# Patient Record
Sex: FEMALE | Race: WHITE | NOT HISPANIC OR LATINO | Employment: OTHER | URBAN - NONMETROPOLITAN AREA
[De-identification: names, ages, dates, MRNs, and addresses within clinical notes are randomized per-mention and may not be internally consistent; named-entity substitution may affect disease eponyms.]

---

## 2023-04-25 DIAGNOSIS — E78.2 MIXED HYPERLIPIDEMIA: ICD-10-CM

## 2023-04-25 DIAGNOSIS — I10 PRIMARY HYPERTENSION: Primary | ICD-10-CM

## 2023-04-26 PROBLEM — E55.9 VITAMIN D DEFICIENCY: Status: ACTIVE | Noted: 2023-04-26

## 2023-04-26 PROBLEM — M17.12 PRIMARY OSTEOARTHRITIS OF LEFT KNEE: Status: ACTIVE | Noted: 2023-04-26

## 2023-04-26 PROBLEM — D12.6 TUBULOVILLOUS ADENOMA OF COLON: Status: ACTIVE | Noted: 2023-04-26

## 2023-04-26 PROBLEM — E87.6 HYPOKALEMIA: Status: ACTIVE | Noted: 2023-04-26

## 2023-04-26 PROBLEM — M85.80 OSTEOPENIA: Status: RESOLVED | Noted: 2023-04-26 | Resolved: 2023-04-26

## 2023-04-26 PROBLEM — M81.8 AGE-RELATED OSTEOPOROSIS WITHOUT FRACTURE: Status: ACTIVE | Noted: 2023-04-26

## 2023-04-26 PROBLEM — E78.1 HYPERTRIGLYCERIDEMIA: Status: RESOLVED | Noted: 2023-04-26 | Resolved: 2023-04-26

## 2023-04-26 PROBLEM — E21.0 HYPERPARATHYROIDISM, PRIMARY (MULTI): Status: ACTIVE | Noted: 2023-04-26

## 2023-04-26 PROBLEM — R73.03 PREDIABETES: Status: ACTIVE | Noted: 2023-04-26

## 2023-04-26 PROBLEM — E83.52 HYPERCALCEMIA: Status: ACTIVE | Noted: 2023-04-26

## 2023-04-26 PROBLEM — E78.5 HYPERLIPIDEMIA: Status: ACTIVE | Noted: 2023-04-26

## 2023-04-26 PROBLEM — I10 HYPERTENSION: Status: ACTIVE | Noted: 2023-04-26

## 2023-04-26 RX ORDER — ATENOLOL AND CHLORTHALIDONE TABLET 100; 25 MG/1; MG/1
TABLET ORAL
Qty: 90 TABLET | Refills: 0 | Status: SHIPPED | OUTPATIENT
Start: 2023-04-26 | End: 2023-06-15 | Stop reason: SDUPTHER

## 2023-04-26 RX ORDER — LOVASTATIN 40 MG/1
TABLET ORAL
Qty: 90 TABLET | Refills: 0 | Status: SHIPPED | OUTPATIENT
Start: 2023-04-26 | End: 2023-06-15 | Stop reason: SDUPTHER

## 2023-06-15 ENCOUNTER — OFFICE VISIT (OUTPATIENT)
Dept: PRIMARY CARE | Facility: CLINIC | Age: 68
End: 2023-06-15
Payer: MEDICARE

## 2023-06-15 VITALS
WEIGHT: 144 LBS | SYSTOLIC BLOOD PRESSURE: 138 MMHG | HEART RATE: 80 BPM | HEIGHT: 60 IN | OXYGEN SATURATION: 97 % | DIASTOLIC BLOOD PRESSURE: 72 MMHG | BODY MASS INDEX: 28.27 KG/M2

## 2023-06-15 DIAGNOSIS — E21.0 HYPERPARATHYROIDISM, PRIMARY (MULTI): ICD-10-CM

## 2023-06-15 DIAGNOSIS — Z12.11 ENCOUNTER FOR SCREENING COLONOSCOPY: ICD-10-CM

## 2023-06-15 DIAGNOSIS — Z13.89 ENCOUNTER FOR SCREENING FOR OTHER DISORDER: ICD-10-CM

## 2023-06-15 DIAGNOSIS — I10 PRIMARY HYPERTENSION: ICD-10-CM

## 2023-06-15 DIAGNOSIS — E78.2 MIXED HYPERLIPIDEMIA: ICD-10-CM

## 2023-06-15 DIAGNOSIS — Z71.89 CARDIAC RISK COUNSELING: ICD-10-CM

## 2023-06-15 DIAGNOSIS — Z00.00 ROUTINE GENERAL MEDICAL EXAMINATION AT HEALTH CARE FACILITY: Primary | ICD-10-CM

## 2023-06-15 DIAGNOSIS — G62.9 NEUROPATHY: ICD-10-CM

## 2023-06-15 DIAGNOSIS — E83.52 HYPERCALCEMIA: ICD-10-CM

## 2023-06-15 DIAGNOSIS — E55.9 VITAMIN D DEFICIENCY: ICD-10-CM

## 2023-06-15 LAB
ALANINE AMINOTRANSFERASE (SGPT) (U/L) IN SER/PLAS: 21 U/L (ref 7–45)
ALBUMIN (G/DL) IN SER/PLAS: 4.4 G/DL (ref 3.4–5)
ALKALINE PHOSPHATASE (U/L) IN SER/PLAS: 76 U/L (ref 33–136)
ANION GAP IN SER/PLAS: 12 MMOL/L (ref 10–20)
ASPARTATE AMINOTRANSFERASE (SGOT) (U/L) IN SER/PLAS: 19 U/L (ref 9–39)
BILIRUBIN TOTAL (MG/DL) IN SER/PLAS: 0.9 MG/DL (ref 0–1.2)
CALCIUM (MG/DL) IN SER/PLAS: 10.7 MG/DL (ref 8.6–10.3)
CARBON DIOXIDE, TOTAL (MMOL/L) IN SER/PLAS: 32 MMOL/L (ref 21–32)
CHLORIDE (MMOL/L) IN SER/PLAS: 102 MMOL/L (ref 98–107)
CHOLESTEROL (MG/DL) IN SER/PLAS: 179 MG/DL (ref 0–199)
CHOLESTEROL IN HDL (MG/DL) IN SER/PLAS: 39.3 MG/DL
CHOLESTEROL/HDL RATIO: 4.6
CREATININE (MG/DL) IN SER/PLAS: 0.57 MG/DL (ref 0.5–1.05)
ERYTHROCYTE DISTRIBUTION WIDTH (RATIO) BY AUTOMATED COUNT: 13.5 % (ref 11.5–14.5)
ERYTHROCYTE MEAN CORPUSCULAR HEMOGLOBIN CONCENTRATION (G/DL) BY AUTOMATED: 33.2 G/DL (ref 32–36)
ERYTHROCYTE MEAN CORPUSCULAR VOLUME (FL) BY AUTOMATED COUNT: 92 FL (ref 80–100)
ERYTHROCYTES (10*6/UL) IN BLOOD BY AUTOMATED COUNT: 4.67 X10E12/L (ref 4–5.2)
GFR FEMALE: >90 ML/MIN/1.73M2
GLUCOSE (MG/DL) IN SER/PLAS: 120 MG/DL (ref 74–99)
HEMATOCRIT (%) IN BLOOD BY AUTOMATED COUNT: 43.1 % (ref 36–46)
HEMOGLOBIN (G/DL) IN BLOOD: 14.3 G/DL (ref 12–16)
LDL: 94 MG/DL (ref 0–99)
LEUKOCYTES (10*3/UL) IN BLOOD BY AUTOMATED COUNT: 8 X10E9/L (ref 4.4–11.3)
NON HDL CHOLESTEROL: 140 MG/DL
PARATHYRIN INTACT (PG/ML) IN SER/PLAS: 42.9 PG/ML (ref 18.5–88)
PLATELETS (10*3/UL) IN BLOOD AUTOMATED COUNT: 248 X10E9/L (ref 150–450)
POC APPEARANCE, URINE: CLEAR
POC BILIRUBIN, URINE: NEGATIVE
POC BLOOD, URINE: NEGATIVE
POC COLOR, URINE: YELLOW
POC GLUCOSE, URINE: NEGATIVE MG/DL
POC KETONES, URINE: NEGATIVE MG/DL
POC LEUKOCYTES, URINE: ABNORMAL
POC NITRITE,URINE: NEGATIVE
POC PH, URINE: 7 PH
POC PROTEIN, URINE: NEGATIVE MG/DL
POC SPECIFIC GRAVITY, URINE: 1.02
POC UROBILINOGEN, URINE: 0.2 EU/DL
POTASSIUM (MMOL/L) IN SER/PLAS: 3.8 MMOL/L (ref 3.5–5.3)
PROTEIN TOTAL: 6.4 G/DL (ref 6.4–8.2)
SODIUM (MMOL/L) IN SER/PLAS: 142 MMOL/L (ref 136–145)
TRIGLYCERIDE (MG/DL) IN SER/PLAS: 230 MG/DL (ref 0–149)
UREA NITROGEN (MG/DL) IN SER/PLAS: 19 MG/DL (ref 6–23)
VLDL: 46 MG/DL (ref 0–40)

## 2023-06-15 PROCEDURE — 82306 VITAMIN D 25 HYDROXY: CPT

## 2023-06-15 PROCEDURE — 99397 PER PM REEVAL EST PAT 65+ YR: CPT | Performed by: FAMILY MEDICINE

## 2023-06-15 PROCEDURE — 81003 URINALYSIS AUTO W/O SCOPE: CPT | Performed by: FAMILY MEDICINE

## 2023-06-15 PROCEDURE — G0446 INTENS BEHAVE THER CARDIO DX: HCPCS | Performed by: FAMILY MEDICINE

## 2023-06-15 PROCEDURE — 80053 COMPREHEN METABOLIC PANEL: CPT

## 2023-06-15 PROCEDURE — 99214 OFFICE O/P EST MOD 30 MIN: CPT | Performed by: FAMILY MEDICINE

## 2023-06-15 PROCEDURE — 1036F TOBACCO NON-USER: CPT | Performed by: FAMILY MEDICINE

## 2023-06-15 PROCEDURE — G0439 PPPS, SUBSEQ VISIT: HCPCS | Performed by: FAMILY MEDICINE

## 2023-06-15 PROCEDURE — 85027 COMPLETE CBC AUTOMATED: CPT

## 2023-06-15 PROCEDURE — 82607 VITAMIN B-12: CPT

## 2023-06-15 PROCEDURE — 3078F DIAST BP <80 MM HG: CPT | Performed by: FAMILY MEDICINE

## 2023-06-15 PROCEDURE — 1170F FXNL STATUS ASSESSED: CPT | Performed by: FAMILY MEDICINE

## 2023-06-15 PROCEDURE — 83970 ASSAY OF PARATHORMONE: CPT

## 2023-06-15 PROCEDURE — G0444 DEPRESSION SCREEN ANNUAL: HCPCS | Performed by: FAMILY MEDICINE

## 2023-06-15 PROCEDURE — 1160F RVW MEDS BY RX/DR IN RCRD: CPT | Performed by: FAMILY MEDICINE

## 2023-06-15 PROCEDURE — 1159F MED LIST DOCD IN RCRD: CPT | Performed by: FAMILY MEDICINE

## 2023-06-15 PROCEDURE — 3075F SYST BP GE 130 - 139MM HG: CPT | Performed by: FAMILY MEDICINE

## 2023-06-15 PROCEDURE — 80061 LIPID PANEL: CPT

## 2023-06-15 RX ORDER — MULTIVITAMIN
1 TABLET ORAL
COMMUNITY
Start: 2014-06-23

## 2023-06-15 RX ORDER — LOVASTATIN 40 MG/1
40 TABLET ORAL NIGHTLY
Qty: 90 TABLET | Refills: 1 | Status: SHIPPED | OUTPATIENT
Start: 2023-06-15 | End: 2023-10-18 | Stop reason: HOSPADM

## 2023-06-15 RX ORDER — CHOLECALCIFEROL (VITAMIN D3) 50 MCG
TABLET ORAL
COMMUNITY

## 2023-06-15 RX ORDER — POTASSIUM CHLORIDE 750 MG/1
10 TABLET, EXTENDED RELEASE ORAL DAILY
Qty: 90 TABLET | Refills: 1 | Status: SHIPPED | OUTPATIENT
Start: 2023-06-15 | End: 2023-08-21

## 2023-06-15 RX ORDER — OMEGA-3 FATTY ACIDS 1000 MG
1000 CAPSULE ORAL
COMMUNITY
Start: 2014-06-23

## 2023-06-15 RX ORDER — ASCORBIC ACID 500 MG
500 TABLET ORAL
COMMUNITY
Start: 2014-06-23

## 2023-06-15 RX ORDER — POTASSIUM CHLORIDE 750 MG/1
TABLET, EXTENDED RELEASE ORAL
COMMUNITY
Start: 2017-10-23 | End: 2023-06-15 | Stop reason: SDUPTHER

## 2023-06-15 RX ORDER — ATENOLOL AND CHLORTHALIDONE TABLET 100; 25 MG/1; MG/1
1 TABLET ORAL DAILY
Qty: 90 TABLET | Refills: 1 | Status: SHIPPED | OUTPATIENT
Start: 2023-06-15 | End: 2023-10-20

## 2023-06-15 ASSESSMENT — ACTIVITIES OF DAILY LIVING (ADL)
GROCERY_SHOPPING: INDEPENDENT
DRESSING: INDEPENDENT
BATHING: INDEPENDENT
DOING_HOUSEWORK: INDEPENDENT
TAKING_MEDICATION: INDEPENDENT
MANAGING_FINANCES: INDEPENDENT

## 2023-06-15 ASSESSMENT — ENCOUNTER SYMPTOMS
HEADACHES: 0
PALPITATIONS: 0
DYSURIA: 0
COLOR CHANGE: 0
COUGH: 0
DIARRHEA: 0
DIZZINESS: 0
EYE PAIN: 0
ABDOMINAL PAIN: 0
WEAKNESS: 0
NERVOUS/ANXIOUS: 0
EYE REDNESS: 0
FEVER: 0
TREMORS: 0
WHEEZING: 0
HEMATURIA: 0
BRUISES/BLEEDS EASILY: 0
BACK PAIN: 0
NUMBNESS: 0
POLYDIPSIA: 0
VOMITING: 0
ADENOPATHY: 0
FATIGUE: 0
BLOOD IN STOOL: 0
CHILLS: 0
NAUSEA: 0
POLYPHAGIA: 0
DYSPHORIC MOOD: 0
FREQUENCY: 0
ARTHRALGIAS: 1
SHORTNESS OF BREATH: 0
SORE THROAT: 0
TROUBLE SWALLOWING: 0
CONSTIPATION: 0
CHEST TIGHTNESS: 0

## 2023-06-15 ASSESSMENT — PATIENT HEALTH QUESTIONNAIRE - PHQ9
2. FEELING DOWN, DEPRESSED OR HOPELESS: NOT AT ALL
1. LITTLE INTEREST OR PLEASURE IN DOING THINGS: NOT AT ALL
SUM OF ALL RESPONSES TO PHQ9 QUESTIONS 1 AND 2: 0

## 2023-06-15 NOTE — PROGRESS NOTES
Subjective   Reason for Visit: Mari eDluca is an 67 y.o. female here for a Medicare Wellness visit.     Past Medical, Surgical, and Family History reviewed and updated in chart.    Reviewed all medications by prescribing practitioner or clinical pharmacist (such as prescriptions, OTCs, herbal therapies and supplements) and documented in the medical record.    HPI  No SE to meds   Denies fevers, chills, HA. CP, SOB, palpitations, weakness, dizziness, HA, vision changes   B12 helping with tingling in her hands and fingers at night     Appetite good  Sleeping is good   Energy level good   Balanced diet   Exercising less because of left knee pain     Ophtho- 11/22  Dentist-12/22  Colonoscopy- ordered   UA/Micro-  Mammo- 12/22  DEXA- 12/21  PAP- N/A  Lung CT- N/A  Coronary Calcium CT Score-  EKG-  Pneumovax- 12/19  Prevnar- 10/18  Flu-11/22  Shingrix-  Td-  Hep C-  Advance Directives- has them      Patient Care Team:  Anders Pappas DO as PCP - General (Family Medicine)  Anders Pappas DO as PCP - [employer] PCP  Franko Saravia DO as Consulting Physician (Orthopaedic Surgery)  Ashu Jason DPM as Surgeon (Podiatry)  Malcolm Simpson DO as Consulting Physician (Family Medicine)     Review of Systems   Constitutional:  Negative for chills, fatigue and fever.   HENT:  Negative for congestion, ear discharge, ear pain, hearing loss, nosebleeds, sore throat, tinnitus and trouble swallowing.    Eyes:  Negative for pain, redness and visual disturbance.   Respiratory:  Negative for cough, chest tightness, shortness of breath and wheezing.    Cardiovascular:  Negative for chest pain, palpitations and leg swelling.   Gastrointestinal:  Negative for abdominal pain, blood in stool, constipation, diarrhea, nausea and vomiting.   Endocrine: Negative for cold intolerance, heat intolerance, polydipsia, polyphagia and polyuria.   Genitourinary:  Negative for dysuria, frequency, hematuria and urgency.    Musculoskeletal:  Positive for arthralgias. Negative for back pain and gait problem.   Skin:  Negative for color change and rash.   Neurological:  Negative for dizziness, tremors, syncope, weakness, numbness and headaches.   Hematological:  Negative for adenopathy. Does not bruise/bleed easily.   Psychiatric/Behavioral:  Negative for dysphoric mood. The patient is not nervous/anxious.    All other systems reviewed and are negative.      Objective   Vitals:  /72   Pulse 80   Ht 1.524 m (5')   Wt 65.3 kg (144 lb)   SpO2 97%   BMI 28.12 kg/m²       Physical Exam  Constitutional:       Appearance: Normal appearance.   HENT:      Head: Normocephalic and atraumatic.      Right Ear: Tympanic membrane, ear canal and external ear normal.      Left Ear: Tympanic membrane, ear canal and external ear normal.      Nose: Nose normal.      Mouth/Throat:      Mouth: Mucous membranes are moist.      Pharynx: Oropharynx is clear.   Eyes:      Extraocular Movements: Extraocular movements intact.      Conjunctiva/sclera: Conjunctivae normal.      Pupils: Pupils are equal, round, and reactive to light.   Cardiovascular:      Rate and Rhythm: Normal rate and regular rhythm.      Pulses: Normal pulses.      Heart sounds: Normal heart sounds.   Pulmonary:      Effort: Pulmonary effort is normal.      Breath sounds: Normal breath sounds.   Abdominal:      General: Abdomen is flat. Bowel sounds are normal.      Palpations: Abdomen is soft.   Musculoskeletal:      Cervical back: Normal range of motion and neck supple.   Skin:     General: Skin is warm and dry.      Capillary Refill: Capillary refill takes less than 2 seconds.   Neurological:      General: No focal deficit present.      Mental Status: She is alert and oriented to person, place, and time.      Cranial Nerves: No cranial nerve deficit.      Sensory: No sensory deficit.      Motor: No weakness.      Deep Tendon Reflexes: Reflexes normal.   Psychiatric:         Mood  and Affect: Mood normal.         Behavior: Behavior normal.         Assessment/Plan   Problem List Items Addressed This Visit       Hypercalcemia    Relevant Orders    PTH, intact (Completed)    Hyperlipidemia    Relevant Medications    lovastatin (Mevacor) 40 mg tablet    Other Relevant Orders    Lipid Panel (Completed)    Comprehensive Metabolic Panel (Completed)    CBC (Completed)    Hyperparathyroidism, primary (CMS/HCC)    Relevant Orders    Comprehensive Metabolic Panel (Completed)    CBC (Completed)    Primary hypertension    Relevant Medications    atenoloL-chlorthalidone (Tenoretic) 100-25 mg tablet    Klor-Con M10 10 mEq ER tablet    Other Relevant Orders    POCT UA Automated manually resulted (Completed)    Vitamin D deficiency    Relevant Orders    Vitamin D, Total     Other Visit Diagnoses       Routine general medical examination at health care facility    -  Primary    Cardiac risk counseling        Encounter for screening for other disorder        Encounter for screening colonoscopy        Relevant Orders    Colonoscopy    Neuropathy        Relevant Orders    Vitamin B12          Renewed/continued rest of medications  Checked labs  Updated Health Maintenance in HPI section  HTN, controlled- continue meds, low sodium diet     Overweight- caloric restriction, increase CV exercise     Hyperlipidemia- continue meds, low fat/cholesterol diet     Hyperparathyroidism- follow PTH, vitamin D     Vitamin D- supplement, follow level    Neuropathy- check B12 level, continue supplement     F/U 6 months     Cardiovascular risk discussed and, if needed, lifestyle modifications recommended, including nutritional choices, exercise, and elimination of habits contributing to risk. We agreed on a plan to reduce the current cardiovascular risk. See the ASCVD Risk  Plus (13.6%) for data discussed regarding risk and risk reduction opportunities. Aspirin use/disuse was discussed after reviewing updated  guidelines

## 2023-06-15 NOTE — PATIENT INSTRUCTIONS
It is important to wear your sun block when you are going to spend more then a minute or two in the sun to reduce your risk of skin cancer    If you are a woman, then you should be doing a self breast exam once a month to feel for any lumps or bumps that do not resolve during the month. Call if you find this.    If you are a man, then you should be doing a self testicular exam once a month to feel for any lumps or bumps. Call if you find this.    You should get on average 150 minutes of cardiovascular exercise (such as brisk walking, running, biking, swimming, etc.) a week.  You should also limit food high in sodium, sugar and saturated/trans fats.  Eating lots of fruits and vegetables is good at helping lower cholesterol and blood pressure if prepared correctly    The age for colonoscopy is age 45-75 for average risk individuals    Prostate screen starts at age 50 and breast cancer screening is at age 40    Woman should get a pap smear between the ages of 21 and 65. The frequency depends on your age, the type of pap you had last and the result of the last pap.    Alcohol should be limited to 1 a day for women and 2 a day for men.    No amount of tobacco in any form is safe. It is recommended that no tobacco be used in any form.  Vapes are also not safe as there are multiple harmful chemicals in them and the heat can directly damage lung tissue.

## 2023-06-16 LAB
CALCIDIOL (25 OH VITAMIN D3) (NG/ML) IN SER/PLAS: 40 NG/ML
COBALAMIN (VITAMIN B12) (PG/ML) IN SER/PLAS: 754 PG/ML (ref 211–911)

## 2023-07-12 ENCOUNTER — OFFICE VISIT (OUTPATIENT)
Dept: PRIMARY CARE | Facility: CLINIC | Age: 68
End: 2023-07-12
Payer: MEDICARE

## 2023-07-12 VITALS
HEART RATE: 62 BPM | DIASTOLIC BLOOD PRESSURE: 68 MMHG | BODY MASS INDEX: 28.82 KG/M2 | OXYGEN SATURATION: 98 % | SYSTOLIC BLOOD PRESSURE: 112 MMHG | HEIGHT: 60 IN | WEIGHT: 146.8 LBS

## 2023-07-12 DIAGNOSIS — Z01.810 PREOP CARDIOVASCULAR EXAM: ICD-10-CM

## 2023-07-12 DIAGNOSIS — R73.03 PREDIABETES: ICD-10-CM

## 2023-07-12 DIAGNOSIS — E55.9 VITAMIN D DEFICIENCY: ICD-10-CM

## 2023-07-12 DIAGNOSIS — E21.0 HYPERPARATHYROIDISM, PRIMARY (MULTI): ICD-10-CM

## 2023-07-12 DIAGNOSIS — M17.12 PRIMARY OSTEOARTHRITIS OF LEFT KNEE: Primary | ICD-10-CM

## 2023-07-12 DIAGNOSIS — Z13.89 ENCOUNTER FOR SCREENING FOR OTHER DISORDER: ICD-10-CM

## 2023-07-12 DIAGNOSIS — I10 PRIMARY HYPERTENSION: ICD-10-CM

## 2023-07-12 DIAGNOSIS — E78.2 MIXED HYPERLIPIDEMIA: ICD-10-CM

## 2023-07-12 LAB
ALANINE AMINOTRANSFERASE (SGPT) (U/L) IN SER/PLAS: 21 U/L (ref 7–45)
ALBUMIN (G/DL) IN SER/PLAS: 4.2 G/DL (ref 3.4–5)
ALKALINE PHOSPHATASE (U/L) IN SER/PLAS: 72 U/L (ref 33–136)
ANION GAP IN SER/PLAS: 14 MMOL/L (ref 10–20)
ASPARTATE AMINOTRANSFERASE (SGOT) (U/L) IN SER/PLAS: 21 U/L (ref 9–39)
BASOPHILS (10*3/UL) IN BLOOD BY AUTOMATED COUNT: 0.03 X10E9/L (ref 0–0.1)
BASOPHILS/100 LEUKOCYTES IN BLOOD BY AUTOMATED COUNT: 0.5 % (ref 0–2)
BILIRUBIN TOTAL (MG/DL) IN SER/PLAS: 0.8 MG/DL (ref 0–1.2)
CALCIUM (MG/DL) IN SER/PLAS: 10.3 MG/DL (ref 8.6–10.3)
CARBON DIOXIDE, TOTAL (MMOL/L) IN SER/PLAS: 29 MMOL/L (ref 21–32)
CHLORIDE (MMOL/L) IN SER/PLAS: 102 MMOL/L (ref 98–107)
CREATININE (MG/DL) IN SER/PLAS: 0.59 MG/DL (ref 0.5–1.05)
EOSINOPHILS (10*3/UL) IN BLOOD BY AUTOMATED COUNT: 0.13 X10E9/L (ref 0–0.7)
EOSINOPHILS/100 LEUKOCYTES IN BLOOD BY AUTOMATED COUNT: 2 % (ref 0–6)
ERYTHROCYTE DISTRIBUTION WIDTH (RATIO) BY AUTOMATED COUNT: 13.2 % (ref 11.5–14.5)
ERYTHROCYTE MEAN CORPUSCULAR HEMOGLOBIN CONCENTRATION (G/DL) BY AUTOMATED: 32.5 G/DL (ref 32–36)
ERYTHROCYTE MEAN CORPUSCULAR VOLUME (FL) BY AUTOMATED COUNT: 93 FL (ref 80–100)
ERYTHROCYTES (10*6/UL) IN BLOOD BY AUTOMATED COUNT: 4.71 X10E12/L (ref 4–5.2)
GFR FEMALE: >90 ML/MIN/1.73M2
GLUCOSE (MG/DL) IN SER/PLAS: 111 MG/DL (ref 74–99)
HEMATOCRIT (%) IN BLOOD BY AUTOMATED COUNT: 43.7 % (ref 36–46)
HEMOGLOBIN (G/DL) IN BLOOD: 14.2 G/DL (ref 12–16)
IMMATURE GRANULOCYTES/100 LEUKOCYTES IN BLOOD BY AUTOMATED COUNT: 0.2 % (ref 0–0.9)
LEUKOCYTES (10*3/UL) IN BLOOD BY AUTOMATED COUNT: 6.5 X10E9/L (ref 4.4–11.3)
LYMPHOCYTES (10*3/UL) IN BLOOD BY AUTOMATED COUNT: 2.94 X10E9/L (ref 1.2–4.8)
LYMPHOCYTES/100 LEUKOCYTES IN BLOOD BY AUTOMATED COUNT: 45 % (ref 13–44)
MONOCYTES (10*3/UL) IN BLOOD BY AUTOMATED COUNT: 0.66 X10E9/L (ref 0.1–1)
MONOCYTES/100 LEUKOCYTES IN BLOOD BY AUTOMATED COUNT: 10.1 % (ref 2–10)
NEUTROPHILS (10*3/UL) IN BLOOD BY AUTOMATED COUNT: 2.76 X10E9/L (ref 1.2–7.7)
NEUTROPHILS/100 LEUKOCYTES IN BLOOD BY AUTOMATED COUNT: 42.2 % (ref 40–80)
PLATELETS (10*3/UL) IN BLOOD AUTOMATED COUNT: 252 X10E9/L (ref 150–450)
POC APPEARANCE, URINE: CLEAR
POC BILIRUBIN, URINE: NEGATIVE
POC BLOOD, URINE: NEGATIVE
POC COLOR, URINE: YELLOW
POC GLUCOSE, URINE: NEGATIVE MG/DL
POC KETONES, URINE: NEGATIVE MG/DL
POC LEUKOCYTES, URINE: ABNORMAL
POC NITRITE,URINE: NEGATIVE
POC PH, URINE: 8.5 PH
POC PROTEIN, URINE: NEGATIVE MG/DL
POC SPECIFIC GRAVITY, URINE: 1.02
POC UROBILINOGEN, URINE: 0.2 EU/DL
POTASSIUM (MMOL/L) IN SER/PLAS: 3.5 MMOL/L (ref 3.5–5.3)
PROTEIN TOTAL: 6.4 G/DL (ref 6.4–8.2)
SODIUM (MMOL/L) IN SER/PLAS: 141 MMOL/L (ref 136–145)
UREA NITROGEN (MG/DL) IN SER/PLAS: 18 MG/DL (ref 6–23)

## 2023-07-12 PROCEDURE — 93000 ELECTROCARDIOGRAM COMPLETE: CPT | Performed by: FAMILY MEDICINE

## 2023-07-12 PROCEDURE — 85025 COMPLETE CBC W/AUTO DIFF WBC: CPT

## 2023-07-12 PROCEDURE — G0442 ANNUAL ALCOHOL SCREEN 15 MIN: HCPCS | Performed by: FAMILY MEDICINE

## 2023-07-12 PROCEDURE — 1160F RVW MEDS BY RX/DR IN RCRD: CPT | Performed by: FAMILY MEDICINE

## 2023-07-12 PROCEDURE — 3078F DIAST BP <80 MM HG: CPT | Performed by: FAMILY MEDICINE

## 2023-07-12 PROCEDURE — 1159F MED LIST DOCD IN RCRD: CPT | Performed by: FAMILY MEDICINE

## 2023-07-12 PROCEDURE — 1036F TOBACCO NON-USER: CPT | Performed by: FAMILY MEDICINE

## 2023-07-12 PROCEDURE — 81003 URINALYSIS AUTO W/O SCOPE: CPT | Performed by: FAMILY MEDICINE

## 2023-07-12 PROCEDURE — 80053 COMPREHEN METABOLIC PANEL: CPT

## 2023-07-12 PROCEDURE — 3074F SYST BP LT 130 MM HG: CPT | Performed by: FAMILY MEDICINE

## 2023-07-12 PROCEDURE — 99214 OFFICE O/P EST MOD 30 MIN: CPT | Performed by: FAMILY MEDICINE

## 2023-07-12 ASSESSMENT — ENCOUNTER SYMPTOMS
NAUSEA: 0
FEVER: 0
CHILLS: 0
HEADACHES: 0
ADENOPATHY: 0
VOMITING: 0
PALPITATIONS: 0
TREMORS: 0
FREQUENCY: 0
DIARRHEA: 0
HEMATURIA: 0
WHEEZING: 0
ARTHRALGIAS: 1
SHORTNESS OF BREATH: 0
POLYDIPSIA: 0
EYE REDNESS: 0
WEAKNESS: 0
NERVOUS/ANXIOUS: 0
EYE PAIN: 0
COUGH: 0
DYSPHORIC MOOD: 0
COLOR CHANGE: 0
CONSTIPATION: 0
TROUBLE SWALLOWING: 0
BLOOD IN STOOL: 0
NUMBNESS: 0
CHEST TIGHTNESS: 0
DIZZINESS: 0
BRUISES/BLEEDS EASILY: 0
BACK PAIN: 0
POLYPHAGIA: 0
DYSURIA: 0
ABDOMINAL PAIN: 0
SORE THROAT: 0
FATIGUE: 0

## 2023-07-12 NOTE — PATIENT INSTRUCTIONS
Hold aspirin for 5-7 days prior to surgery and restart 2-3 days after or as recommended by surgeon. No ibuprofen, naproxen or other anti-inflammatory during that period as well. You may take acetaminophen (tylenol) instead.
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
## 2023-08-19 DIAGNOSIS — I10 PRIMARY HYPERTENSION: ICD-10-CM

## 2023-08-21 RX ORDER — POTASSIUM CHLORIDE 750 MG/1
10 TABLET, FILM COATED, EXTENDED RELEASE ORAL DAILY
Qty: 100 TABLET | Refills: 2 | Status: SHIPPED | OUTPATIENT
Start: 2023-08-21 | End: 2024-05-28

## 2023-09-30 PROBLEM — L81.4 OTHER MELANIN HYPERPIGMENTATION: Status: ACTIVE | Noted: 2022-06-08

## 2023-09-30 PROBLEM — D22.61 MELANOCYTIC NEVI OF RIGHT UPPER LIMB, INCLUDING SHOULDER: Status: ACTIVE | Noted: 2022-06-08

## 2023-09-30 PROBLEM — L90.5 SCAR CONDITION AND FIBROSIS OF SKIN: Status: ACTIVE | Noted: 2022-06-08

## 2023-09-30 PROBLEM — D18.01 HEMANGIOMA OF SKIN AND SUBCUTANEOUS TISSUE: Status: ACTIVE | Noted: 2022-06-08

## 2023-09-30 PROBLEM — D22.71 MELANOCYTIC NEVI OF RIGHT LOWER LIMB, INCLUDING HIP: Status: ACTIVE | Noted: 2022-06-08

## 2023-09-30 PROBLEM — D22.5 MELANOCYTIC NEVI OF TRUNK: Status: ACTIVE | Noted: 2022-06-08

## 2023-09-30 PROBLEM — D22.72 MELANOCYTIC NEVI OF LEFT LOWER LIMB, INCLUDING HIP: Status: ACTIVE | Noted: 2022-06-08

## 2023-09-30 PROBLEM — L82.1 OTHER SEBORRHEIC KERATOSIS: Status: ACTIVE | Noted: 2022-06-08

## 2023-09-30 PROBLEM — D22.62 MELANOCYTIC NEVI OF LEFT UPPER LIMB, INCLUDING SHOULDER: Status: ACTIVE | Noted: 2022-06-08

## 2023-09-30 PROBLEM — D48.5 NEOPLASM OF UNCERTAIN BEHAVIOR OF SKIN: Status: ACTIVE | Noted: 2022-06-08

## 2023-09-30 PROBLEM — L91.8 OTHER HYPERTROPHIC DISORDERS OF THE SKIN: Status: ACTIVE | Noted: 2022-06-08

## 2023-09-30 RX ORDER — HYDROCODONE BITARTRATE AND ACETAMINOPHEN 5; 325 MG/1; MG/1
2 TABLET ORAL EVERY 8 HOURS PRN
Status: ON HOLD | COMMUNITY
Start: 2023-08-07 | End: 2023-10-13

## 2023-09-30 RX ORDER — ASPIRIN 325 MG
325 TABLET ORAL DAILY
Status: ON HOLD | COMMUNITY
Start: 2023-07-27 | End: 2023-10-13 | Stop reason: WASHOUT

## 2023-09-30 RX ORDER — SOD SULF/POT CHLORIDE/MAG SULF 1.479 G
TABLET ORAL
COMMUNITY
Start: 2023-07-05 | End: 2023-10-18 | Stop reason: HOSPADM

## 2023-09-30 RX ORDER — IBUPROFEN 800 MG/1
800 TABLET ORAL
COMMUNITY
Start: 2023-07-27

## 2023-09-30 RX ORDER — HYDROMORPHONE HYDROCHLORIDE 2 MG/1
1 TABLET ORAL 2 TIMES DAILY PRN
COMMUNITY
Start: 2023-07-27 | End: 2023-11-01 | Stop reason: ALTCHOICE

## 2023-09-30 RX ORDER — LOVASTATIN 20 MG/1
1 TABLET ORAL
COMMUNITY
Start: 2014-06-23 | End: 2023-12-18 | Stop reason: SDUPTHER

## 2023-10-05 ENCOUNTER — OFFICE VISIT (OUTPATIENT)
Dept: DERMATOLOGY | Facility: CLINIC | Age: 68
End: 2023-10-05
Payer: MEDICARE

## 2023-10-05 DIAGNOSIS — L82.1 SEBORRHEIC KERATOSIS: Primary | ICD-10-CM

## 2023-10-05 DIAGNOSIS — D18.01 HEMANGIOMA OF SKIN: ICD-10-CM

## 2023-10-05 DIAGNOSIS — Z12.83 SCREENING EXAM FOR SKIN CANCER: ICD-10-CM

## 2023-10-05 DIAGNOSIS — L81.4 LENTIGO: ICD-10-CM

## 2023-10-05 PROBLEM — D22.71 MELANOCYTIC NEVI OF RIGHT LOWER LIMB, INCLUDING HIP: Status: RESOLVED | Noted: 2022-06-08 | Resolved: 2023-10-05

## 2023-10-05 PROBLEM — D22.62 MELANOCYTIC NEVI OF LEFT UPPER LIMB, INCLUDING SHOULDER: Status: RESOLVED | Noted: 2022-06-08 | Resolved: 2023-10-05

## 2023-10-05 PROBLEM — Z87.39 HISTORY OF NECROTIZING FASCIITIS: Status: ACTIVE | Noted: 2023-10-05

## 2023-10-05 PROBLEM — D22.61 MELANOCYTIC NEVI OF RIGHT UPPER LIMB, INCLUDING SHOULDER: Status: RESOLVED | Noted: 2022-06-08 | Resolved: 2023-10-05

## 2023-10-05 PROBLEM — L91.8 OTHER HYPERTROPHIC DISORDERS OF THE SKIN: Status: RESOLVED | Noted: 2022-06-08 | Resolved: 2023-10-05

## 2023-10-05 PROBLEM — D22.72 MELANOCYTIC NEVI OF LEFT LOWER LIMB, INCLUDING HIP: Status: RESOLVED | Noted: 2022-06-08 | Resolved: 2023-10-05

## 2023-10-05 PROBLEM — D22.5 MELANOCYTIC NEVI OF TRUNK: Status: RESOLVED | Noted: 2022-06-08 | Resolved: 2023-10-05

## 2023-10-05 PROCEDURE — 1160F RVW MEDS BY RX/DR IN RCRD: CPT | Performed by: DERMATOLOGY

## 2023-10-05 PROCEDURE — 1159F MED LIST DOCD IN RCRD: CPT | Performed by: DERMATOLOGY

## 2023-10-05 PROCEDURE — 1036F TOBACCO NON-USER: CPT | Performed by: DERMATOLOGY

## 2023-10-05 PROCEDURE — 99213 OFFICE O/P EST LOW 20 MIN: CPT | Performed by: DERMATOLOGY

## 2023-10-05 ASSESSMENT — DERMATOLOGY PATIENT ASSESSMENT
DO YOU USE A TANNING BED: NO
ARE YOU AN ORGAN TRANSPLANT RECIPIENT: NO
DO YOU USE SUNSCREEN: DAILY
ARE YOU TRYING TO GET PREGNANT: NO
ARE YOU ON BIRTH CONTROL: NO
DO YOU HAVE IRREGULAR MENSTRUAL CYCLES: NO
DO YOU HAVE ANY NEW OR CHANGING LESIONS: NO
HAVE YOU HAD OR DO YOU HAVE A STAPH INFECTION: NO
HAVE YOU HAD OR DO YOU HAVE VASCULAR DISEASE: NO

## 2023-10-05 ASSESSMENT — ITCH NUMERIC RATING SCALE: HOW SEVERE IS YOUR ITCHING?: 0

## 2023-10-05 ASSESSMENT — DERMATOLOGY QUALITY OF LIFE (QOL) ASSESSMENT
ARE THERE EXCLUSIONS OR EXCEPTIONS FOR THE QUALITY OF LIFE ASSESSMENT: NO
RATE HOW BOTHERED YOU ARE BY SYMPTOMS OF YOUR SKIN PROBLEM (EG, ITCHING, STINGING BURNING, HURTING OR SKIN IRRITATION): 0 - NEVER BOTHERED
RATE HOW BOTHERED YOU ARE BY EFFECTS OF YOUR SKIN PROBLEMS ON YOUR ACTIVITIES (EG, GOING OUT, ACCOMPLISHING WHAT YOU WANT, WORK ACTIVITIES OR YOUR RELATIONSHIPS WITH OTHERS): 0 - NEVER BOTHERED
DATE THE QUALITY-OF-LIFE ASSESSMENT WAS COMPLETED: 66752
RATE HOW EMOTIONALLY BOTHERED YOU ARE BY YOUR SKIN PROBLEM (FOR EXAMPLE, WORRY, EMBARRASSMENT, FRUSTRATION): 0 - NEVER BOTHERED

## 2023-10-05 ASSESSMENT — PATIENT GLOBAL ASSESSMENT (PGA): PATIENT GLOBAL ASSESSMENT: PATIENT GLOBAL ASSESSMENT:  4 - SEVERE

## 2023-10-05 NOTE — PROGRESS NOTES
Subjective     Mari Deluca is a 68 y.o. female who presents for the following: Skin Check.     Review of Systems:  No other skin or systemic complaints other than what is documented elsewhere in the note.    The following portions of the chart were reviewed this encounter and updated as appropriate:         Skin Cancer History  No skin cancer on file.    -- Biopsy 6/6/19 right medial cheek - cystic space in the dermis. Top of an epidermal inclusion cyst was favored, but could also could be consistent with differentiated SCC.      Specialty Problems          Dermatology Problems    Neoplasm of uncertain behavior of skin    Scar condition and fibrosis of skin     Biopsy 6/6/19 right medial cheek - cystic space in the dermis. Top of an epidermal inclusion cyst was favored, but could also could be consistent with differentiated SCC.    Hx of necrotizing fasciitis 2005. Residual scar of left vulva.              Objective   Well appearing patient in no apparent distress; mood and affect are within normal limits.    A full examination was performed including scalp, head, eyes, ears, nose, lips, neck, chest, axillae, abdomen, back, buttocks, bilateral upper extremities, bilateral lower extremities, hands, feet, fingers, toes, fingernails, and toenails. All findings within normal limits unless otherwise noted below.    Assessment/Plan   1. Seborrheic keratosis  Stuck on verrucous, tan-brown papules and plaques.      - Discussed benign nature and that no treatment is necessary unless it becomes painful or increases in size. Patient opts for clinical monitoring at this time.     2. Lentigo  Scattered tan macules in sun-exposed areas.    - Discussed benign nature and that no treatment is necessary unless it becomes painful or increases in size. Patient opts for clinical monitoring at this time.     3. Hemangioma of skin  Scattered cherry-red papule(s).    - Discussed benign nature and that no treatment is necessary unless  it becomes painful or increases in size. Patient opts for clinical monitoring at this time.     4. Screening exam for skin cancer    Full body skin exam  -No lesions concerning for malignancy on the remainder the skin exam today   -Scars from prior skin treatments were evaluated and no evidence of recurrence.  - The ugly duckling sign was discussed. Monitor for any skin lesions that are different in color, shape, or size than others on body  -Sun protection was discussed. Recommend SPF 30+, hats with brims, sun protective clothing, and avoiding sun exposure between 10 AM and 2 PM whenever possible  -Recommend regular skin exams or sooner if new or changing lesions

## 2023-10-13 ENCOUNTER — APPOINTMENT (OUTPATIENT)
Dept: RADIOLOGY | Facility: HOSPITAL | Age: 68
End: 2023-10-13
Payer: MEDICARE

## 2023-10-13 ENCOUNTER — HOSPITAL ENCOUNTER (OUTPATIENT)
Dept: CARDIOLOGY | Facility: HOSPITAL | Age: 68
Discharge: HOME | End: 2023-10-13
Payer: MEDICARE

## 2023-10-13 ENCOUNTER — HOSPITAL ENCOUNTER (INPATIENT)
Facility: HOSPITAL | Age: 68
LOS: 4 days | Discharge: HOME | DRG: 397 | End: 2023-10-18
Attending: FAMILY MEDICINE | Admitting: INTERNAL MEDICINE
Payer: MEDICARE

## 2023-10-13 ENCOUNTER — HOSPITAL ENCOUNTER (EMERGENCY)
Facility: HOSPITAL | Age: 68
Discharge: OTHER INSTITUTION | End: 2023-10-13
Attending: EMERGENCY MEDICINE
Payer: MEDICARE

## 2023-10-13 ENCOUNTER — ANESTHESIA EVENT (OUTPATIENT)
Dept: OPERATING ROOM | Facility: HOSPITAL | Age: 68
DRG: 397 | End: 2023-10-13
Payer: MEDICARE

## 2023-10-13 VITALS
OXYGEN SATURATION: 90 % | HEART RATE: 103 BPM | BODY MASS INDEX: 28.07 KG/M2 | DIASTOLIC BLOOD PRESSURE: 57 MMHG | HEIGHT: 60 IN | RESPIRATION RATE: 16 BRPM | TEMPERATURE: 99.7 F | WEIGHT: 143 LBS | SYSTOLIC BLOOD PRESSURE: 118 MMHG

## 2023-10-13 DIAGNOSIS — J96.01 ACUTE RESPIRATORY FAILURE WITH HYPOXIA (MULTI): ICD-10-CM

## 2023-10-13 DIAGNOSIS — K35.33 ACUTE APPENDICITIS WITH PERFORATION, LOCALIZED PERITONITIS, AND ABSCESS, WITHOUT GANGRENE: ICD-10-CM

## 2023-10-13 DIAGNOSIS — K35.33 ACUTE APPENDICITIS WITH PERFORATION, LOCALIZED PERITONITIS, AND ABSCESS, WITHOUT GANGRENE: Primary | ICD-10-CM

## 2023-10-13 DIAGNOSIS — K35.80 ACUTE APPENDICITIS: Primary | ICD-10-CM

## 2023-10-13 LAB
ALBUMIN SERPL BCP-MCNC: 4.6 G/DL (ref 3.4–5)
ALP SERPL-CCNC: 74 U/L (ref 33–136)
ALT SERPL W P-5'-P-CCNC: 26 U/L (ref 7–45)
ANION GAP SERPL CALC-SCNC: 17 MMOL/L (ref 10–20)
APPEARANCE UR: ABNORMAL
AST SERPL W P-5'-P-CCNC: 22 U/L (ref 9–39)
BASOPHILS # BLD AUTO: 0.02 X10*3/UL (ref 0–0.1)
BASOPHILS NFR BLD AUTO: 0.1 %
BILIRUB SERPL-MCNC: 1.5 MG/DL (ref 0–1.2)
BILIRUB UR STRIP.AUTO-MCNC: NEGATIVE MG/DL
BUN SERPL-MCNC: 15 MG/DL (ref 6–23)
CALCIUM SERPL-MCNC: 10.2 MG/DL (ref 8.6–10.3)
CHLORIDE SERPL-SCNC: 94 MMOL/L (ref 98–107)
CO2 SERPL-SCNC: 26 MMOL/L (ref 21–32)
COLOR UR: ABNORMAL
CREAT SERPL-MCNC: 0.7 MG/DL (ref 0.5–1.05)
EOSINOPHIL # BLD AUTO: 0 X10*3/UL (ref 0–0.7)
EOSINOPHIL NFR BLD AUTO: 0 %
ERYTHROCYTE [DISTWIDTH] IN BLOOD BY AUTOMATED COUNT: 13 % (ref 11.5–14.5)
GFR SERPL CREATININE-BSD FRML MDRD: >90 ML/MIN/1.73M*2
GLUCOSE SERPL-MCNC: 156 MG/DL (ref 74–99)
GLUCOSE UR STRIP.AUTO-MCNC: NEGATIVE MG/DL
HCT VFR BLD AUTO: 43.5 % (ref 36–46)
HGB BLD-MCNC: 14.9 G/DL (ref 12–16)
HOLD SPECIMEN: NORMAL
IMM GRANULOCYTES # BLD AUTO: 0.07 X10*3/UL (ref 0–0.7)
IMM GRANULOCYTES NFR BLD AUTO: 0.5 % (ref 0–0.9)
KETONES UR STRIP.AUTO-MCNC: ABNORMAL MG/DL
LACTATE SERPL-SCNC: 3.5 MMOL/L (ref 0.4–2)
LEUKOCYTE ESTERASE UR QL STRIP.AUTO: ABNORMAL
LIPASE SERPL-CCNC: 16 U/L (ref 9–82)
LYMPHOCYTES # BLD AUTO: 1.46 X10*3/UL (ref 1.2–4.8)
LYMPHOCYTES NFR BLD AUTO: 9.9 %
MAGNESIUM SERPL-MCNC: 1.53 MG/DL (ref 1.6–2.4)
MCH RBC QN AUTO: 30.1 PG (ref 26–34)
MCHC RBC AUTO-ENTMCNC: 34.3 G/DL (ref 32–36)
MCV RBC AUTO: 88 FL (ref 80–100)
MONOCYTES # BLD AUTO: 1.03 X10*3/UL (ref 0.1–1)
MONOCYTES NFR BLD AUTO: 7 %
MUCOUS THREADS #/AREA URNS AUTO: NORMAL /LPF
NEUTROPHILS # BLD AUTO: 12.16 X10*3/UL (ref 1.2–7.7)
NEUTROPHILS NFR BLD AUTO: 82.5 %
NITRITE UR QL STRIP.AUTO: NEGATIVE
NRBC BLD-RTO: 0 /100 WBCS (ref 0–0)
PH UR STRIP.AUTO: 5 [PH]
PLATELET # BLD AUTO: 271 X10*3/UL (ref 150–450)
PMV BLD AUTO: 8.6 FL (ref 7.5–11.5)
POTASSIUM SERPL-SCNC: 3 MMOL/L (ref 3.5–5.3)
PROT SERPL-MCNC: 7.2 G/DL (ref 6.4–8.2)
PROT UR STRIP.AUTO-MCNC: ABNORMAL MG/DL
RBC # BLD AUTO: 4.95 X10*6/UL (ref 4–5.2)
RBC # UR STRIP.AUTO: ABNORMAL /UL
RBC #/AREA URNS AUTO: NORMAL /HPF
SARS-COV-2 RNA RESP QL NAA+PROBE: NOT DETECTED
SODIUM SERPL-SCNC: 134 MMOL/L (ref 136–145)
SP GR UR STRIP.AUTO: 1.02
SQUAMOUS #/AREA URNS AUTO: NORMAL /HPF
TRANS CELLS #/AREA UR COMP ASSIST: NORMAL /HPF
UROBILINOGEN UR STRIP.AUTO-MCNC: <2 MG/DL
WBC # BLD AUTO: 14.7 X10*3/UL (ref 4.4–11.3)
WBC #/AREA URNS AUTO: NORMAL /HPF

## 2023-10-13 PROCEDURE — 2500000004 HC RX 250 GENERAL PHARMACY W/ HCPCS (ALT 636 FOR OP/ED): Performed by: EMERGENCY MEDICINE

## 2023-10-13 PROCEDURE — 81003 URINALYSIS AUTO W/O SCOPE: CPT | Performed by: EMERGENCY MEDICINE

## 2023-10-13 PROCEDURE — 84075 ASSAY ALKALINE PHOSPHATASE: CPT | Performed by: EMERGENCY MEDICINE

## 2023-10-13 PROCEDURE — 85610 PROTHROMBIN TIME: CPT | Performed by: NURSE PRACTITIONER

## 2023-10-13 PROCEDURE — 2500000004 HC RX 250 GENERAL PHARMACY W/ HCPCS (ALT 636 FOR OP/ED): Performed by: NURSE PRACTITIONER

## 2023-10-13 PROCEDURE — 87086 URINE CULTURE/COLONY COUNT: CPT | Mod: CMCLAB,GENLAB | Performed by: EMERGENCY MEDICINE

## 2023-10-13 PROCEDURE — 93010 ELECTROCARDIOGRAM REPORT: CPT | Performed by: INTERNAL MEDICINE

## 2023-10-13 PROCEDURE — 99285 EMERGENCY DEPT VISIT HI MDM: CPT | Mod: 25

## 2023-10-13 PROCEDURE — 87081 CULTURE SCREEN ONLY: CPT | Mod: CMCLAB,GEALAB | Performed by: NURSE PRACTITIONER

## 2023-10-13 PROCEDURE — 87075 CULTR BACTERIA EXCEPT BLOOD: CPT | Mod: CMCLAB,GENLAB | Performed by: EMERGENCY MEDICINE

## 2023-10-13 PROCEDURE — 83605 ASSAY OF LACTIC ACID: CPT | Performed by: EMERGENCY MEDICINE

## 2023-10-13 PROCEDURE — 96375 TX/PRO/DX INJ NEW DRUG ADDON: CPT

## 2023-10-13 PROCEDURE — 83605 ASSAY OF LACTIC ACID: CPT | Performed by: NURSE PRACTITIONER

## 2023-10-13 PROCEDURE — 2550000001 HC RX 255 CONTRASTS: Performed by: EMERGENCY MEDICINE

## 2023-10-13 PROCEDURE — 96361 HYDRATE IV INFUSION ADD-ON: CPT

## 2023-10-13 PROCEDURE — 87635 SARS-COV-2 COVID-19 AMP PRB: CPT | Performed by: EMERGENCY MEDICINE

## 2023-10-13 PROCEDURE — 93005 ELECTROCARDIOGRAM TRACING: CPT

## 2023-10-13 PROCEDURE — 87040 BLOOD CULTURE FOR BACTERIA: CPT | Mod: CMCLAB,GENLAB | Performed by: EMERGENCY MEDICINE

## 2023-10-13 PROCEDURE — 36415 COLL VENOUS BLD VENIPUNCTURE: CPT | Performed by: EMERGENCY MEDICINE

## 2023-10-13 PROCEDURE — 99223 1ST HOSP IP/OBS HIGH 75: CPT | Performed by: NURSE PRACTITIONER

## 2023-10-13 PROCEDURE — 2500000001 HC RX 250 WO HCPCS SELF ADMINISTERED DRUGS (ALT 637 FOR MEDICARE OP): Performed by: NURSE PRACTITIONER

## 2023-10-13 PROCEDURE — 74177 CT ABD & PELVIS W/CONTRAST: CPT | Mod: MG

## 2023-10-13 PROCEDURE — G0378 HOSPITAL OBSERVATION PER HR: HCPCS

## 2023-10-13 PROCEDURE — G0379 DIRECT REFER HOSPITAL OBSERV: HCPCS

## 2023-10-13 PROCEDURE — 96365 THER/PROPH/DIAG IV INF INIT: CPT

## 2023-10-13 PROCEDURE — 85025 COMPLETE CBC W/AUTO DIFF WBC: CPT | Performed by: EMERGENCY MEDICINE

## 2023-10-13 PROCEDURE — 74177 CT ABD & PELVIS W/CONTRAST: CPT | Performed by: STUDENT IN AN ORGANIZED HEALTH CARE EDUCATION/TRAINING PROGRAM

## 2023-10-13 PROCEDURE — 83690 ASSAY OF LIPASE: CPT | Performed by: EMERGENCY MEDICINE

## 2023-10-13 PROCEDURE — 96372 THER/PROPH/DIAG INJ SC/IM: CPT

## 2023-10-13 PROCEDURE — 83735 ASSAY OF MAGNESIUM: CPT | Performed by: EMERGENCY MEDICINE

## 2023-10-13 RX ORDER — MAGNESIUM SULFATE 1 G/100ML
1 INJECTION INTRAVENOUS ONCE
Status: COMPLETED | OUTPATIENT
Start: 2023-10-13 | End: 2023-10-14

## 2023-10-13 RX ORDER — CHLORTHALIDONE 25 MG/1
25 TABLET ORAL DAILY
Status: CANCELLED | OUTPATIENT
Start: 2023-10-14

## 2023-10-13 RX ORDER — ONDANSETRON HYDROCHLORIDE 2 MG/ML
4 INJECTION, SOLUTION INTRAVENOUS ONCE
Status: COMPLETED | OUTPATIENT
Start: 2023-10-13 | End: 2023-10-13

## 2023-10-13 RX ORDER — KETOROLAC TROMETHAMINE 15 MG/ML
15 INJECTION, SOLUTION INTRAMUSCULAR; INTRAVENOUS EVERY 6 HOURS
Status: DISCONTINUED | OUTPATIENT
Start: 2023-10-13 | End: 2023-10-14

## 2023-10-13 RX ORDER — POTASSIUM CHLORIDE 14.9 MG/ML
20 INJECTION INTRAVENOUS ONCE
Status: COMPLETED | OUTPATIENT
Start: 2023-10-13 | End: 2023-10-14

## 2023-10-13 RX ORDER — ONDANSETRON HYDROCHLORIDE 2 MG/ML
4 INJECTION, SOLUTION INTRAVENOUS EVERY 8 HOURS PRN
Status: DISCONTINUED | OUTPATIENT
Start: 2023-10-13 | End: 2023-10-19 | Stop reason: HOSPADM

## 2023-10-13 RX ORDER — ACETAMINOPHEN 325 MG/1
650 TABLET ORAL EVERY 4 HOURS PRN
Status: CANCELLED | OUTPATIENT
Start: 2023-10-13

## 2023-10-13 RX ORDER — ONDANSETRON HYDROCHLORIDE 2 MG/ML
4 INJECTION, SOLUTION INTRAVENOUS EVERY 8 HOURS PRN
Status: CANCELLED | OUTPATIENT
Start: 2023-10-13

## 2023-10-13 RX ORDER — TALC
3 POWDER (GRAM) TOPICAL DAILY
Status: CANCELLED | OUTPATIENT
Start: 2023-10-13

## 2023-10-13 RX ORDER — PRAVASTATIN SODIUM 40 MG/1
20 TABLET ORAL NIGHTLY
Status: DISCONTINUED | OUTPATIENT
Start: 2023-10-13 | End: 2023-10-19 | Stop reason: HOSPADM

## 2023-10-13 RX ORDER — ENOXAPARIN SODIUM 100 MG/ML
40 INJECTION SUBCUTANEOUS EVERY 24 HOURS
Status: DISCONTINUED | OUTPATIENT
Start: 2023-10-13 | End: 2023-10-19 | Stop reason: HOSPADM

## 2023-10-13 RX ORDER — VANCOMYCIN HYDROCHLORIDE 1 G/200ML
1000 INJECTION, SOLUTION INTRAVENOUS ONCE
Status: COMPLETED | OUTPATIENT
Start: 2023-10-13 | End: 2023-10-13

## 2023-10-13 RX ORDER — ATENOLOL AND CHLORTHALIDONE TABLET 100; 25 MG/1; MG/1
1 TABLET ORAL DAILY
Status: DISCONTINUED | OUTPATIENT
Start: 2023-10-15 | End: 2023-10-13

## 2023-10-13 RX ORDER — POTASSIUM CHLORIDE 14.9 MG/ML
20 INJECTION INTRAVENOUS ONCE
Status: DISCONTINUED | OUTPATIENT
Start: 2023-10-13 | End: 2023-10-13 | Stop reason: HOSPADM

## 2023-10-13 RX ORDER — SODIUM CHLORIDE 9 MG/ML
125 INJECTION, SOLUTION INTRAVENOUS CONTINUOUS
Status: DISCONTINUED | OUTPATIENT
Start: 2023-10-13 | End: 2023-10-13 | Stop reason: HOSPADM

## 2023-10-13 RX ORDER — TALC
3 POWDER (GRAM) TOPICAL DAILY
Status: DISCONTINUED | OUTPATIENT
Start: 2023-10-13 | End: 2023-10-19 | Stop reason: HOSPADM

## 2023-10-13 RX ORDER — LOVASTATIN 20 MG/1
20 TABLET ORAL
Status: DISCONTINUED | OUTPATIENT
Start: 2023-10-14 | End: 2023-10-13 | Stop reason: CLARIF

## 2023-10-13 RX ORDER — ACETAMINOPHEN 325 MG/1
650 TABLET ORAL EVERY 4 HOURS
Status: DISCONTINUED | OUTPATIENT
Start: 2023-10-13 | End: 2023-10-19 | Stop reason: HOSPADM

## 2023-10-13 RX ORDER — SODIUM CHLORIDE 9 MG/ML
100 INJECTION, SOLUTION INTRAVENOUS CONTINUOUS
Status: CANCELLED | OUTPATIENT
Start: 2023-10-13

## 2023-10-13 RX ORDER — ATENOLOL 50 MG/1
100 TABLET ORAL DAILY
Status: DISCONTINUED | OUTPATIENT
Start: 2023-10-14 | End: 2023-10-19 | Stop reason: HOSPADM

## 2023-10-13 RX ORDER — CHLORTHALIDONE 25 MG/1
25 TABLET ORAL DAILY
Status: DISCONTINUED | OUTPATIENT
Start: 2023-10-15 | End: 2023-10-19 | Stop reason: HOSPADM

## 2023-10-13 RX ORDER — ACETAMINOPHEN 325 MG/1
650 TABLET ORAL EVERY 4 HOURS PRN
Status: DISCONTINUED | OUTPATIENT
Start: 2023-10-13 | End: 2023-10-13

## 2023-10-13 RX ORDER — DEXTROSE MONOHYDRATE AND SODIUM CHLORIDE 5; .9 G/100ML; G/100ML
100 INJECTION, SOLUTION INTRAVENOUS CONTINUOUS
Status: DISCONTINUED | OUTPATIENT
Start: 2023-10-13 | End: 2023-10-14

## 2023-10-13 RX ADMIN — POTASSIUM CHLORIDE 20 MEQ: 14.9 INJECTION, SOLUTION INTRAVENOUS at 23:19

## 2023-10-13 RX ADMIN — SODIUM CHLORIDE 125 ML/HR: 9 INJECTION, SOLUTION INTRAVENOUS at 12:19

## 2023-10-13 RX ADMIN — PIPERACILLIN SODIUM AND TAZOBACTAM SODIUM 3.38 G: 3; .375 INJECTION, SOLUTION INTRAVENOUS at 18:29

## 2023-10-13 RX ADMIN — SODIUM CHLORIDE 125 ML/HR: 9 INJECTION, SOLUTION INTRAVENOUS at 18:33

## 2023-10-13 RX ADMIN — SODIUM CHLORIDE 500 ML: 9 INJECTION, SOLUTION INTRAVENOUS at 23:06

## 2023-10-13 RX ADMIN — HYDROMORPHONE HYDROCHLORIDE 0.5 MG: 1 INJECTION, SOLUTION INTRAMUSCULAR; INTRAVENOUS; SUBCUTANEOUS at 12:17

## 2023-10-13 RX ADMIN — SODIUM CHLORIDE 1000 ML: 9 INJECTION, SOLUTION INTRAVENOUS at 18:35

## 2023-10-13 RX ADMIN — VANCOMYCIN HYDROCHLORIDE 1000 MG: 1 INJECTION, SOLUTION INTRAVENOUS at 18:32

## 2023-10-13 RX ADMIN — ACETAMINOPHEN 650 MG: 325 TABLET ORAL at 22:34

## 2023-10-13 RX ADMIN — SODIUM CHLORIDE 1000 ML: 9 INJECTION, SOLUTION INTRAVENOUS at 18:28

## 2023-10-13 RX ADMIN — MELATONIN TAB 3 MG 3 MG: 3 TAB at 22:35

## 2023-10-13 RX ADMIN — ONDANSETRON 4 MG: 2 INJECTION INTRAMUSCULAR; INTRAVENOUS at 12:16

## 2023-10-13 RX ADMIN — IOHEXOL 75 ML: 350 INJECTION, SOLUTION INTRAVENOUS at 15:47

## 2023-10-13 RX ADMIN — MAGNESIUM SULFATE HEPTAHYDRATE 1 G: 1 INJECTION, SOLUTION INTRAVENOUS at 23:20

## 2023-10-13 RX ADMIN — PRAVASTATIN SODIUM 20 MG: 40 TABLET ORAL at 22:36

## 2023-10-13 RX ADMIN — DEXTROSE AND SODIUM CHLORIDE 100 ML/HR: 5; 900 INJECTION, SOLUTION INTRAVENOUS at 22:47

## 2023-10-13 RX ADMIN — KETOROLAC TROMETHAMINE 15 MG: 15 INJECTION INTRAMUSCULAR; INTRAVENOUS at 22:35

## 2023-10-13 RX ADMIN — POTASSIUM CHLORIDE 20 MEQ: 14.9 INJECTION, SOLUTION INTRAVENOUS at 18:30

## 2023-10-13 SDOH — SOCIAL STABILITY: SOCIAL INSECURITY: DO YOU FEEL ANYONE HAS EXPLOITED OR TAKEN ADVANTAGE OF YOU FINANCIALLY OR OF YOUR PERSONAL PROPERTY?: NO

## 2023-10-13 SDOH — ECONOMIC STABILITY: INCOME INSECURITY: HOW HARD IS IT FOR YOU TO PAY FOR THE VERY BASICS LIKE FOOD, HOUSING, MEDICAL CARE, AND HEATING?: NOT HARD AT ALL

## 2023-10-13 SDOH — HEALTH STABILITY: MENTAL HEALTH: HOW OFTEN DO YOU HAVE 6 OR MORE DRINKS ON ONE OCCASION?: NEVER

## 2023-10-13 SDOH — SOCIAL STABILITY: SOCIAL INSECURITY: WERE YOU ABLE TO COMPLETE ALL THE BEHAVIORAL HEALTH SCREENINGS?: YES

## 2023-10-13 SDOH — ECONOMIC STABILITY: HOUSING INSECURITY
IN THE LAST 12 MONTHS, WAS THERE A TIME WHEN YOU DID NOT HAVE A STEADY PLACE TO SLEEP OR SLEPT IN A SHELTER (INCLUDING NOW)?: NO

## 2023-10-13 SDOH — SOCIAL STABILITY: SOCIAL INSECURITY: DOES ANYONE TRY TO KEEP YOU FROM HAVING/CONTACTING OTHER FRIENDS OR DOING THINGS OUTSIDE YOUR HOME?: NO

## 2023-10-13 SDOH — SOCIAL STABILITY: SOCIAL INSECURITY: DO YOU FEEL UNSAFE GOING BACK TO THE PLACE WHERE YOU ARE LIVING?: NO

## 2023-10-13 SDOH — SOCIAL STABILITY: SOCIAL INSECURITY: HAS ANYONE EVER THREATENED TO HURT YOUR FAMILY OR YOUR PETS?: NO

## 2023-10-13 SDOH — HEALTH STABILITY: MENTAL HEALTH: HOW OFTEN DO YOU HAVE A DRINK CONTAINING ALCOHOL?: NEVER

## 2023-10-13 SDOH — SOCIAL STABILITY: SOCIAL INSECURITY: ABUSE: ADULT

## 2023-10-13 SDOH — SOCIAL STABILITY: SOCIAL INSECURITY: ARE YOU OR HAVE YOU BEEN THREATENED OR ABUSED PHYSICALLY, EMOTIONALLY, OR SEXUALLY BY ANYONE?: NO

## 2023-10-13 SDOH — ECONOMIC STABILITY: INCOME INSECURITY: IN THE LAST 12 MONTHS, WAS THERE A TIME WHEN YOU WERE NOT ABLE TO PAY THE MORTGAGE OR RENT ON TIME?: NO

## 2023-10-13 SDOH — SOCIAL STABILITY: SOCIAL INSECURITY: HAVE YOU HAD THOUGHTS OF HARMING ANYONE ELSE?: NO

## 2023-10-13 SDOH — ECONOMIC STABILITY: TRANSPORTATION INSECURITY
IN THE PAST 12 MONTHS, HAS LACK OF TRANSPORTATION KEPT YOU FROM MEETINGS, WORK, OR FROM GETTING THINGS NEEDED FOR DAILY LIVING?: NO

## 2023-10-13 SDOH — HEALTH STABILITY: MENTAL HEALTH: HOW MANY STANDARD DRINKS CONTAINING ALCOHOL DO YOU HAVE ON A TYPICAL DAY?: PATIENT DOES NOT DRINK

## 2023-10-13 SDOH — ECONOMIC STABILITY: TRANSPORTATION INSECURITY
IN THE PAST 12 MONTHS, HAS THE LACK OF TRANSPORTATION KEPT YOU FROM MEDICAL APPOINTMENTS OR FROM GETTING MEDICATIONS?: NO

## 2023-10-13 SDOH — SOCIAL STABILITY: SOCIAL INSECURITY: ARE THERE ANY APPARENT SIGNS OF INJURIES/BEHAVIORS THAT COULD BE RELATED TO ABUSE/NEGLECT?: NO

## 2023-10-13 ASSESSMENT — ENCOUNTER SYMPTOMS
DIZZINESS: 0
MYALGIAS: 0
NECK STIFFNESS: 0
BACK PAIN: 0
NECK PAIN: 0
BLOOD IN STOOL: 0
VOMITING: 1
POLYDIPSIA: 0
EYE REDNESS: 0
ABDOMINAL DISTENTION: 1
RHINORRHEA: 0
SHORTNESS OF BREATH: 0
SORE THROAT: 0
DIAPHORESIS: 0
EYE ITCHING: 0
APPETITE CHANGE: 1
DIARRHEA: 0
NAUSEA: 1
FEVER: 1
BRUISES/BLEEDS EASILY: 0
CHILLS: 1
ARTHRALGIAS: 0
ABDOMINAL PAIN: 1
PALPITATIONS: 0
AGITATION: 0
CONSTIPATION: 1
HEADACHES: 0
COUGH: 0
DYSURIA: 0
CHEST TIGHTNESS: 0

## 2023-10-13 ASSESSMENT — PAIN SCALES - GENERAL
PAINLEVEL_OUTOF10: 8
PAINLEVEL_OUTOF10: 5 - MODERATE PAIN
PAINLEVEL_OUTOF10: 5 - MODERATE PAIN

## 2023-10-13 ASSESSMENT — COLUMBIA-SUICIDE SEVERITY RATING SCALE - C-SSRS
1. IN THE PAST MONTH, HAVE YOU WISHED YOU WERE DEAD OR WISHED YOU COULD GO TO SLEEP AND NOT WAKE UP?: NO
2. HAVE YOU ACTUALLY HAD ANY THOUGHTS OF KILLING YOURSELF?: NO
6. HAVE YOU EVER DONE ANYTHING, STARTED TO DO ANYTHING, OR PREPARED TO DO ANYTHING TO END YOUR LIFE?: NO

## 2023-10-13 ASSESSMENT — LIFESTYLE VARIABLES
AUDIT-C TOTAL SCORE: 0
AUDIT-C TOTAL SCORE: 0
SKIP TO QUESTIONS 9-10: 1
HOW OFTEN DO YOU HAVE 6 OR MORE DRINKS ON ONE OCCASION: NEVER
SKIP TO QUESTIONS 9-10: 1
HOW MANY STANDARD DRINKS CONTAINING ALCOHOL DO YOU HAVE ON A TYPICAL DAY: PATIENT DOES NOT DRINK
HOW OFTEN DO YOU HAVE A DRINK CONTAINING ALCOHOL: NEVER
AUDIT-C TOTAL SCORE: 0

## 2023-10-13 ASSESSMENT — ACTIVITIES OF DAILY LIVING (ADL)
JUDGMENT_ADEQUATE_SAFELY_COMPLETE_DAILY_ACTIVITIES: YES
HEARING - LEFT EAR: FUNCTIONAL
ADEQUATE_TO_COMPLETE_ADL: YES
HEARING - RIGHT EAR: FUNCTIONAL
DRESSING YOURSELF: INDEPENDENT
PATIENT'S MEMORY ADEQUATE TO SAFELY COMPLETE DAILY ACTIVITIES?: YES
BATHING: INDEPENDENT
GROOMING: INDEPENDENT
LACK_OF_TRANSPORTATION: NO
FEEDING YOURSELF: INDEPENDENT
WALKS IN HOME: INDEPENDENT
TOILETING: INDEPENDENT

## 2023-10-13 ASSESSMENT — PATIENT HEALTH QUESTIONNAIRE - PHQ9
SUM OF ALL RESPONSES TO PHQ9 QUESTIONS 1 & 2: 0
1. LITTLE INTEREST OR PLEASURE IN DOING THINGS: NOT AT ALL
2. FEELING DOWN, DEPRESSED OR HOPELESS: NOT AT ALL

## 2023-10-13 ASSESSMENT — COGNITIVE AND FUNCTIONAL STATUS - GENERAL
DAILY ACTIVITIY SCORE: 24
PATIENT BASELINE BEDBOUND: NO
MOBILITY SCORE: 24

## 2023-10-13 ASSESSMENT — PAIN DESCRIPTION - ORIENTATION: ORIENTATION: LOWER

## 2023-10-13 ASSESSMENT — PAIN DESCRIPTION - LOCATION: LOCATION: ABDOMEN

## 2023-10-13 ASSESSMENT — PAIN - FUNCTIONAL ASSESSMENT: PAIN_FUNCTIONAL_ASSESSMENT: 0-10
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
## 2023-10-14 ENCOUNTER — ANESTHESIA (OUTPATIENT)
Dept: OPERATING ROOM | Facility: HOSPITAL | Age: 68
DRG: 397 | End: 2023-10-14
Payer: MEDICARE

## 2023-10-14 LAB
ABO GROUP (TYPE) IN BLOOD: NORMAL
ANION GAP SERPL CALC-SCNC: 11 MMOL/L (ref 10–20)
ANTIBODY SCREEN: NORMAL
BUN SERPL-MCNC: 11 MG/DL (ref 6–23)
CALCIUM SERPL-MCNC: 8 MG/DL (ref 8.6–10.3)
CHLORIDE SERPL-SCNC: 105 MMOL/L (ref 98–107)
CO2 SERPL-SCNC: 25 MMOL/L (ref 21–32)
CREAT SERPL-MCNC: 0.55 MG/DL (ref 0.5–1.05)
ERYTHROCYTE [DISTWIDTH] IN BLOOD BY AUTOMATED COUNT: 13.6 % (ref 11.5–14.5)
GFR SERPL CREATININE-BSD FRML MDRD: >90 ML/MIN/1.73M*2
GLUCOSE SERPL-MCNC: 157 MG/DL (ref 74–99)
HCT VFR BLD AUTO: 34.8 % (ref 36–46)
HGB BLD-MCNC: 11.6 G/DL (ref 12–16)
INR PPP: 1.7 (ref 0.9–1.1)
LACTATE SERPL-SCNC: 1 MMOL/L (ref 0.4–2)
MAGNESIUM SERPL-MCNC: 1.89 MG/DL (ref 1.6–2.4)
MCH RBC QN AUTO: 29.8 PG (ref 26–34)
MCHC RBC AUTO-ENTMCNC: 33.3 G/DL (ref 32–36)
MCV RBC AUTO: 90 FL (ref 80–100)
NRBC BLD-RTO: 0 /100 WBCS (ref 0–0)
PLATELET # BLD AUTO: 175 X10*3/UL (ref 150–450)
PMV BLD AUTO: 8.2 FL (ref 7.5–11.5)
POTASSIUM SERPL-SCNC: 2.9 MMOL/L (ref 3.5–5.3)
PROTHROMBIN TIME: 19 SECONDS (ref 9.8–12.8)
RBC # BLD AUTO: 3.89 X10*6/UL (ref 4–5.2)
RH FACTOR (ANTIGEN D): NORMAL
SODIUM SERPL-SCNC: 138 MMOL/L (ref 136–145)
WBC # BLD AUTO: 11.7 X10*3/UL (ref 4.4–11.3)

## 2023-10-14 PROCEDURE — 1100000001 HC PRIVATE ROOM DAILY

## 2023-10-14 PROCEDURE — 99223 1ST HOSP IP/OBS HIGH 75: CPT | Performed by: SURGERY

## 2023-10-14 PROCEDURE — 96372 THER/PROPH/DIAG INJ SC/IM: CPT | Performed by: SURGERY

## 2023-10-14 PROCEDURE — 83735 ASSAY OF MAGNESIUM: CPT | Performed by: NURSE PRACTITIONER

## 2023-10-14 PROCEDURE — 44970 LAPAROSCOPY APPENDECTOMY: CPT | Performed by: SURGERY

## 2023-10-14 PROCEDURE — 87040 BLOOD CULTURE FOR BACTERIA: CPT | Mod: CMCLAB,GEALAB | Performed by: NURSE PRACTITIONER

## 2023-10-14 PROCEDURE — 85027 COMPLETE CBC AUTOMATED: CPT | Performed by: NURSE PRACTITIONER

## 2023-10-14 PROCEDURE — 2500000005 HC RX 250 GENERAL PHARMACY W/O HCPCS: Performed by: NURSE ANESTHETIST, CERTIFIED REGISTERED

## 2023-10-14 PROCEDURE — 2500000001 HC RX 250 WO HCPCS SELF ADMINISTERED DRUGS (ALT 637 FOR MEDICARE OP): Performed by: SURGERY

## 2023-10-14 PROCEDURE — 3700000002 HC GENERAL ANESTHESIA TIME - EACH INCREMENTAL 1 MINUTE: Performed by: SURGERY

## 2023-10-14 PROCEDURE — 2720000007 HC OR 272 NO HCPCS: Performed by: SURGERY

## 2023-10-14 PROCEDURE — 3600000004 HC OR TIME - INITIAL BASE CHARGE - PROCEDURE LEVEL FOUR: Performed by: SURGERY

## 2023-10-14 PROCEDURE — 3600000009 HC OR TIME - EACH INCREMENTAL 1 MINUTE - PROCEDURE LEVEL FOUR: Performed by: SURGERY

## 2023-10-14 PROCEDURE — 2500000004 HC RX 250 GENERAL PHARMACY W/ HCPCS (ALT 636 FOR OP/ED): Performed by: NURSE PRACTITIONER

## 2023-10-14 PROCEDURE — 80048 BASIC METABOLIC PNL TOTAL CA: CPT | Performed by: NURSE PRACTITIONER

## 2023-10-14 PROCEDURE — G0378 HOSPITAL OBSERVATION PER HR: HCPCS

## 2023-10-14 PROCEDURE — 0DTJ4ZZ RESECTION OF APPENDIX, PERCUTANEOUS ENDOSCOPIC APPROACH: ICD-10-PCS | Performed by: SURGERY

## 2023-10-14 PROCEDURE — 99232 SBSQ HOSP IP/OBS MODERATE 35: CPT | Performed by: FAMILY MEDICINE

## 2023-10-14 PROCEDURE — 36415 COLL VENOUS BLD VENIPUNCTURE: CPT | Performed by: NURSE PRACTITIONER

## 2023-10-14 PROCEDURE — 7100000002 HC RECOVERY ROOM TIME - EACH INCREMENTAL 1 MINUTE: Performed by: SURGERY

## 2023-10-14 PROCEDURE — 3700000001 HC GENERAL ANESTHESIA TIME - INITIAL BASE CHARGE: Performed by: SURGERY

## 2023-10-14 PROCEDURE — 2500000004 HC RX 250 GENERAL PHARMACY W/ HCPCS (ALT 636 FOR OP/ED): Performed by: SURGERY

## 2023-10-14 PROCEDURE — 88304 TISSUE EXAM BY PATHOLOGIST: CPT | Performed by: PATHOLOGY

## 2023-10-14 PROCEDURE — 7100000001 HC RECOVERY ROOM TIME - INITIAL BASE CHARGE: Performed by: SURGERY

## 2023-10-14 PROCEDURE — 86901 BLOOD TYPING SEROLOGIC RH(D): CPT | Performed by: NURSE PRACTITIONER

## 2023-10-14 PROCEDURE — 2500000004 HC RX 250 GENERAL PHARMACY W/ HCPCS (ALT 636 FOR OP/ED): Performed by: ANESTHESIOLOGY

## 2023-10-14 PROCEDURE — 88304 TISSUE EXAM BY PATHOLOGIST: CPT | Mod: TC | Performed by: SURGERY

## 2023-10-14 PROCEDURE — 2500000005 HC RX 250 GENERAL PHARMACY W/O HCPCS: Performed by: ANESTHESIOLOGY

## 2023-10-14 RX ORDER — POTASSIUM CHLORIDE 20 MEQ/1
40 TABLET, EXTENDED RELEASE ORAL ONCE
Status: COMPLETED | OUTPATIENT
Start: 2023-10-14 | End: 2023-10-14

## 2023-10-14 RX ORDER — BUPIVACAINE HYDROCHLORIDE 5 MG/ML
INJECTION, SOLUTION EPIDURAL; INTRACAUDAL AS NEEDED
Status: DISCONTINUED | OUTPATIENT
Start: 2023-10-14 | End: 2023-10-14 | Stop reason: HOSPADM

## 2023-10-14 RX ORDER — DROPERIDOL 2.5 MG/ML
0.62 INJECTION, SOLUTION INTRAMUSCULAR; INTRAVENOUS ONCE AS NEEDED
Status: DISCONTINUED | OUTPATIENT
Start: 2023-10-14 | End: 2023-10-14

## 2023-10-14 RX ORDER — MAGNESIUM SULFATE HEPTAHYDRATE 40 MG/ML
2 INJECTION, SOLUTION INTRAVENOUS ONCE
Status: COMPLETED | OUTPATIENT
Start: 2023-10-14 | End: 2023-10-14

## 2023-10-14 RX ORDER — LIDOCAINE HYDROCHLORIDE 20 MG/ML
INJECTION, SOLUTION INFILTRATION; PERINEURAL AS NEEDED
Status: DISCONTINUED | OUTPATIENT
Start: 2023-10-14 | End: 2023-10-14

## 2023-10-14 RX ORDER — ACETAMINOPHEN 160 MG/5ML
650 SOLUTION ORAL EVERY 6 HOURS PRN
Status: CANCELLED | OUTPATIENT
Start: 2023-10-14

## 2023-10-14 RX ORDER — MIDAZOLAM HYDROCHLORIDE 1 MG/ML
INJECTION INTRAMUSCULAR; INTRAVENOUS AS NEEDED
Status: DISCONTINUED | OUTPATIENT
Start: 2023-10-14 | End: 2023-10-14

## 2023-10-14 RX ORDER — POTASSIUM CHLORIDE 14.9 MG/ML
20 INJECTION INTRAVENOUS
Status: DISPENSED | OUTPATIENT
Start: 2023-10-14 | End: 2023-10-14

## 2023-10-14 RX ORDER — FENTANYL CITRATE 50 UG/ML
INJECTION, SOLUTION INTRAMUSCULAR; INTRAVENOUS AS NEEDED
Status: DISCONTINUED | OUTPATIENT
Start: 2023-10-14 | End: 2023-10-14

## 2023-10-14 RX ORDER — SODIUM CHLORIDE, SODIUM LACTATE, POTASSIUM CHLORIDE, CALCIUM CHLORIDE 600; 310; 30; 20 MG/100ML; MG/100ML; MG/100ML; MG/100ML
100 INJECTION, SOLUTION INTRAVENOUS CONTINUOUS
Status: DISCONTINUED | OUTPATIENT
Start: 2023-10-14 | End: 2023-10-14 | Stop reason: HOSPADM

## 2023-10-14 RX ORDER — MIDAZOLAM HYDROCHLORIDE 1 MG/ML
INJECTION, SOLUTION INTRAMUSCULAR; INTRAVENOUS AS NEEDED
Status: DISCONTINUED | OUTPATIENT
Start: 2023-10-14 | End: 2023-10-14

## 2023-10-14 RX ORDER — IBUPROFEN 600 MG/1
600 TABLET ORAL EVERY 6 HOURS SCHEDULED
Status: DISCONTINUED | OUTPATIENT
Start: 2023-10-14 | End: 2023-10-19 | Stop reason: HOSPADM

## 2023-10-14 RX ORDER — PROPOFOL 10 MG/ML
INJECTION, EMULSION INTRAVENOUS AS NEEDED
Status: DISCONTINUED | OUTPATIENT
Start: 2023-10-14 | End: 2023-10-14

## 2023-10-14 RX ORDER — ACETAMINOPHEN 325 MG/1
650 TABLET ORAL EVERY 6 HOURS PRN
Status: CANCELLED | OUTPATIENT
Start: 2023-10-14

## 2023-10-14 RX ORDER — OXYCODONE HYDROCHLORIDE 5 MG/1
5 TABLET ORAL EVERY 4 HOURS PRN
Status: DISCONTINUED | OUTPATIENT
Start: 2023-10-14 | End: 2023-10-19 | Stop reason: HOSPADM

## 2023-10-14 RX ORDER — ONDANSETRON HYDROCHLORIDE 2 MG/ML
4 INJECTION, SOLUTION INTRAVENOUS ONCE AS NEEDED
Status: DISCONTINUED | OUTPATIENT
Start: 2023-10-14 | End: 2023-10-14

## 2023-10-14 RX ORDER — ACETAMINOPHEN 650 MG/1
650 SUPPOSITORY RECTAL EVERY 6 HOURS PRN
Status: CANCELLED | OUTPATIENT
Start: 2023-10-14

## 2023-10-14 RX ORDER — ROCURONIUM BROMIDE 10 MG/ML
INJECTION, SOLUTION INTRAVENOUS AS NEEDED
Status: DISCONTINUED | OUTPATIENT
Start: 2023-10-14 | End: 2023-10-14

## 2023-10-14 RX ORDER — TRAMADOL HYDROCHLORIDE 50 MG/1
50 TABLET ORAL EVERY 4 HOURS PRN
Status: DISCONTINUED | OUTPATIENT
Start: 2023-10-14 | End: 2023-10-19 | Stop reason: HOSPADM

## 2023-10-14 RX ORDER — DEXTROSE MONOHYDRATE AND SODIUM CHLORIDE 5; .9 G/100ML; G/100ML
50 INJECTION, SOLUTION INTRAVENOUS CONTINUOUS
Status: DISCONTINUED | OUTPATIENT
Start: 2023-10-14 | End: 2023-10-16

## 2023-10-14 RX ADMIN — SUGAMMADEX 200 MG: 100 INJECTION, SOLUTION INTRAVENOUS at 12:15

## 2023-10-14 RX ADMIN — PIPERACILLIN SODIUM AND TAZOBACTAM SODIUM 4.5 G: 4; .5 INJECTION, SOLUTION INTRAVENOUS at 02:22

## 2023-10-14 RX ADMIN — DEXAMETHASONE SODIUM PHOSPHATE 4 MG: 4 INJECTION, SOLUTION INTRAMUSCULAR; INTRAVENOUS at 11:13

## 2023-10-14 RX ADMIN — PIPERACILLIN SODIUM AND TAZOBACTAM SODIUM 4.5 G: 4; .5 INJECTION, SOLUTION INTRAVENOUS at 09:06

## 2023-10-14 RX ADMIN — PRAVASTATIN SODIUM 20 MG: 40 TABLET ORAL at 21:42

## 2023-10-14 RX ADMIN — POTASSIUM CHLORIDE 20 MEQ: 14.9 INJECTION, SOLUTION INTRAVENOUS at 06:46

## 2023-10-14 RX ADMIN — KETOROLAC TROMETHAMINE 15 MG: 15 INJECTION INTRAMUSCULAR; INTRAVENOUS at 04:31

## 2023-10-14 RX ADMIN — ONDANSETRON 4 MG: 2 INJECTION INTRAMUSCULAR; INTRAVENOUS at 09:33

## 2023-10-14 RX ADMIN — MAGNESIUM SULFATE HEPTAHYDRATE 2 G: 2 INJECTION, SOLUTION INTRAVENOUS at 15:50

## 2023-10-14 RX ADMIN — PIPERACILLIN SODIUM AND TAZOBACTAM SODIUM 4.5 G: 4; .5 INJECTION, SOLUTION INTRAVENOUS at 14:04

## 2023-10-14 RX ADMIN — PIPERACILLIN SODIUM AND TAZOBACTAM SODIUM 4.5 G: 4; .5 INJECTION, SOLUTION INTRAVENOUS at 19:54

## 2023-10-14 RX ADMIN — ACETAMINOPHEN 650 MG: 325 TABLET ORAL at 21:43

## 2023-10-14 RX ADMIN — POTASSIUM CHLORIDE 40 MEQ: 1500 TABLET, EXTENDED RELEASE ORAL at 06:52

## 2023-10-14 RX ADMIN — IBUPROFEN 600 MG: 600 TABLET ORAL at 19:54

## 2023-10-14 RX ADMIN — IBUPROFEN 600 MG: 600 TABLET ORAL at 14:17

## 2023-10-14 RX ADMIN — FENTANYL CITRATE 100 MCG: 50 INJECTION, SOLUTION INTRAMUSCULAR; INTRAVENOUS at 11:12

## 2023-10-14 RX ADMIN — ONDANSETRON 4 MG: 2 INJECTION INTRAMUSCULAR; INTRAVENOUS at 12:02

## 2023-10-14 RX ADMIN — PROPOFOL 200 MG: 10 INJECTION, EMULSION INTRAVENOUS at 11:17

## 2023-10-14 RX ADMIN — ENOXAPARIN SODIUM 40 MG: 40 INJECTION SUBCUTANEOUS at 21:42

## 2023-10-14 RX ADMIN — OXYCODONE HYDROCHLORIDE 5 MG: 5 TABLET ORAL at 18:46

## 2023-10-14 RX ADMIN — ROCURONIUM BROMIDE 50 MG: 10 INJECTION, SOLUTION INTRAVENOUS at 11:17

## 2023-10-14 RX ADMIN — ACETAMINOPHEN 650 MG: 325 TABLET ORAL at 18:04

## 2023-10-14 RX ADMIN — DEXTROSE AND SODIUM CHLORIDE 50 ML/HR: 5; 900 INJECTION, SOLUTION INTRAVENOUS at 14:05

## 2023-10-14 RX ADMIN — OXYCODONE HYDROCHLORIDE 5 MG: 5 TABLET ORAL at 23:44

## 2023-10-14 RX ADMIN — LIDOCAINE HYDROCHLORIDE 60 MG: 20 INJECTION, SOLUTION INFILTRATION; PERINEURAL at 11:17

## 2023-10-14 RX ADMIN — ATENOLOL 100 MG: 50 TABLET ORAL at 14:17

## 2023-10-14 RX ADMIN — MIDAZOLAM HYDROCHLORIDE 2 MG: 1 INJECTION, SOLUTION INTRAMUSCULAR; INTRAVENOUS at 11:12

## 2023-10-14 RX ADMIN — ACETAMINOPHEN 650 MG: 325 TABLET ORAL at 14:17

## 2023-10-14 SDOH — HEALTH STABILITY: MENTAL HEALTH: CURRENT SMOKER: 0

## 2023-10-14 ASSESSMENT — PAIN SCALES - GENERAL
PAINLEVEL_OUTOF10: 7
PAINLEVEL_OUTOF10: 0 - NO PAIN
PAINLEVEL_OUTOF10: 8
PAINLEVEL_OUTOF10: 0 - NO PAIN
PAINLEVEL_OUTOF10: 7

## 2023-10-14 ASSESSMENT — PAIN - FUNCTIONAL ASSESSMENT
PAIN_FUNCTIONAL_ASSESSMENT: 0-10

## 2023-10-14 ASSESSMENT — COGNITIVE AND FUNCTIONAL STATUS - GENERAL
MOBILITY SCORE: 24
DAILY ACTIVITIY SCORE: 24
DAILY ACTIVITIY SCORE: 24
MOBILITY SCORE: 24

## 2023-10-14 ASSESSMENT — ACTIVITIES OF DAILY LIVING (ADL): LACK_OF_TRANSPORTATION: NO

## 2023-10-14 ASSESSMENT — PAIN DESCRIPTION - DESCRIPTORS: DESCRIPTORS: ACHING

## 2023-10-14 NOTE — PROGRESS NOTES
10/14/23 0957   Discharge Planning   Living Arrangements Spouse/significant other   Support Systems Spouse/significant other   Assistance Needed Independent with ADL's, No DME, Does not drive, Spouse transports,   Type of Residence Private residence   Number of Stairs to Enter Residence 2   Number of Stairs Within Residence 0   Do you have animals or pets at home? Yes   Type of Animals or Pets Cat   Who is requesting discharge planning? Provider   Home or Post Acute Services None   Patient expects to be discharged to: Home with spouse and no discharge needs   Does the patient need discharge transport arranged? No   Financial Resource Strain   How hard is it for you to pay for the very basics like food, housing, medical care, and heating? Not hard   Housing Stability   In the last 12 months, was there a time when you were not able to pay the mortgage or rent on time? N   In the last 12 months, how many places have you lived? 1   In the last 12 months, was there a time when you did not have a steady place to sleep or slept in a shelter (including now)? N   Transportation Needs   In the past 12 months, has lack of transportation kept you from medical appointments or from getting medications? no   In the past 12 months, has lack of transportation kept you from meetings, work, or from getting things needed for daily living? No

## 2023-10-14 NOTE — OP NOTE
Appendectomy Laparoscopy     Operative Date: 10/14/23  Patient's Name: Mari Deluca  Patient's YOB: 1955  Patient's MRN: [id number]  Patient's Age: 68 y.o.  Operating Room Location: [facility]  Patient's ASA: III  Patient's Estimated Blood Loss: 5 mL        PREOPERATIVE DIAGNOSIS:   Acute perforated appendicitis        POSTOPERATIVE DIAGNOSIS:   Acute perforated appendicitis        OPERATION/PROCEDURE:   Laparoscopic appendectomy        SURGEON:   Tito Cabral MD.         ASSISTANT:   Scrub assist.           INDICATIONS:   This is a 68 y.o. female who presented with acute appendicitis.  We discussed preoperatively the concerns for perforation, and the likely need for drain placement and staying for couple of days.        OPERATION:   The reasons for, benefits to, and risks of surgery were discussed with the patient.  The risks included, but not limited to, bleeding, infection, persistent pain, injury to surrounding structures, and postoperative abscess.  The patient appeared to understand and consented for surgery.  She was therefore brought back to the operating room and placed on the operating room table in the supine position.  Her abdomen was prepped and draped in a standard fashion.       We accessed the abdomen in the left upper quadrant with a Veress needle.  We then insufflated the pressure.  After insufflating to pressure, we made a 5 mm incision periumbilically.  We then entered the abdomen with a 5 mm optical view port.  We then removed the Veress needle.  We then performed our bilateral transversus abdominis plane block, instilling 15 mL of half percent Marcaine into each transversus abdominis plane under direct visualization.  We then placed our two 5 mm working ports, one suprapubically and one in the left lower quadrant.  We then placed the patient in Trendelenburg positioning with right side up.  We then inspected the right lower quadrant and  found the appendix.  We then bluntly dissected a window through the mesoappendix at the base of the appendix, where it came off of the cecum.  We then used our LigaSure to transect the mesoappendix along the length of the appendix to our window at the base of the appendix.  We then used 2 separate 0 PDS Endoloops to ligate the appendix at the base of the appendix.  We then used a LigaSure to transect the appendix above our Endoloops.  We then used an Endo Catch bag to extract the appendix through the periumbilical 5 mm port site.  This did require some upsizing of the port site.  We then closed the fascia of this port site with an interrupted figure-of-eight 0 Vicryl suture on a [facility] passer.  We then placed a 19 Syriac Edmar drain in the pelvis and right paracolic gutter and brought it out through the left lower quadrant 5 mm port.  We sutured the drain in place with a 2-0 Prolene suture.  We then removed the remaining ports under direct visualization.  We desufflated the abdomen.  We closed the skin of all of our port sites with subcuticular 4-0 Vicryl suture.  Dermabond was applied over top.         DISPOSITION:   The patient tolerated the procedure well.  There were no immediate complications.  She will be brought to the postanesthesia care unit. From there, she will be discharged home with outpatient followup with me.      I was present and scrubbed in for the entire procedure.      CPT Code:  13858       Operating Room Staff:  Anesthesiologist: Andrea Simons MD  CRNA: OMARI Rodriguez-CRNA  Scrub Person: GAGE Craig MD  General Surgery  Office: [phone]  Fax:     [phone]  12:35 PM  10/14/23

## 2023-10-14 NOTE — ANESTHESIA POSTPROCEDURE EVALUATION
Patient: Mari Deluca    Procedure Summary       Date: 10/14/23 Room / Location: [facility] OR 06 / [facility] OR    Anesthesia Start: 1056 Anesthesia Stop: 1246    Procedure: Appendectomy Laparoscopy (Abdomen) Diagnosis:       Acute appendicitis with perforation, localized peritonitis, and abscess, without gangrene      (Acute appendicitis with perforation, localized peritonitis, and abscess, without gangrene [K35.33])    Surgeons: Tito Cabral MD Responsible Provider: Andrea Simons MD    Anesthesia Type: general ASA Status: 3 - Emergent            Anesthesia Type: general    Vitals Value Taken Time   /100 10/14/23 1234   Temp  10/14/23 1246   Pulse 100 10/14/23 1245   Resp  10/14/23 1246   SpO2 96 % 10/14/23 1245   Vitals shown include unvalidated device data.    Anesthesia Post Evaluation    Patient location during evaluation: PACU  Patient participation: complete - patient participated  Level of consciousness: awake  Pain management: adequate  Multimodal analgesia pain management approach  Airway patency: patent  Two or more strategies used to mitigate risk of obstructive sleep apnea  Cardiovascular status: acceptable  Respiratory status: acceptable  Hydration status: acceptable        No notable events documented.

## 2023-10-14 NOTE — PROGRESS NOTES
DAILY PROGRESS NOTE    Subjective:  Patient seen and examined after surgery, and is currently tolerating a regular diet.      Objective:  Heart Rate:  []   Temp:  [36.4 °C (97.5 °F)-37 °C (98.6 °F)]   Resp:  [15-20]   BP: (110-147)/()   SpO2:  [88 %-99 %]       Intake/Output Summary (Last 24 hours) at 10/14/2023 1610  Last data filed at 10/14/2023 1344  Gross per 24 hour   Intake 150 ml   Output 25 ml   Net 125 ml       Constitutional: patient is alert and cooperative with exam  skin: dry and warm  Eyes: EOMI and clear sclera  ENMT: moist mucous membranes  Head/Neck: NCAT, no JVD  Respiratory/Thorax: Clear to auscultation bilaterally, no wheezing or crackles appreciated  Cardiovascular: regular rate and rhythm, S1 and S2 present, no murmurs heard  Gastrointestinal: bowel sounds present, drain in place with serosanguineous fluid  Extremities: +2 radial, posterior tibial, and pedal pulse, no lower extremity edema noted  Neurological: Alert and orient x3, moves all extremities, no focal deficit        Current Facility-Administered Medications:     acetaminophen (Tylenol) tablet 650 mg, 650 mg, oral, q4h, Tito Cabral MD, 650 mg at 10/14/23 1417    atenolol (Tenormin) tablet 100 mg, 100 mg, oral, Daily, Tito Cabral MD, 100 mg at 10/14/23 1417    [START ON 10/15/2023] chlorthalidone (Hygroton) tablet 25 mg, 25 mg, oral, Daily, Tito Cabral MD    D5 % and 0.9 % sodium chloride infusion, 50 mL/hr, intravenous, Continuous, Tito Cabral MD, Last Rate: 50 mL/hr at 10/14/23 1405, 50 mL/hr at 10/14/23 1405    enoxaparin (Lovenox) syringe 40 mg, 40 mg, subcutaneous, q24h, Tito Cabral MD    ibuprofen tablet 600 mg, 600 mg, oral, q6h DONELL, Tito Cabral MD, 600 mg at 10/14/23 1417    magnesium sulfate IV 2 g, 2 g, intravenous, Once, Tito Cabral MD, Last Rate: 25 mL/hr at 10/14/23 1550, 2 g at 10/14/23 1550    melatonin tablet 3 mg, 3 mg, oral, Daily, Tito  SHEY Cabral MD, 3 mg at 10/13/23 2235    ondansetron (Zofran) injection 4 mg, 4 mg, intravenous, q8h PRN, Tito Cabral MD, 4 mg at 10/14/23 1202    oxyCODONE (Roxicodone) immediate release tablet 5 mg, 5 mg, oral, q4h PRN, Tito Cabral MD    piperacillin-tazobactam-dextrose (Zosyn) IV 4.5 g, 4.5 g, intravenous, q6h, Tito Cabral MD, Stopped at 10/14/23 1434    pravastatin (Pravachol) tablet 20 mg, 20 mg, oral, Nightly, Tito Cabral MD, 20 mg at 10/13/23 2236    traMADol (Ultram) tablet 50 mg, 50 mg, oral, q4h PRN, Tito Cabral MD    Lab Results   Component Value Date    WBC 11.7 (H) 10/14/2023    HGB 11.6 (L) 10/14/2023    HCT 34.8 (L) 10/14/2023    MCV 90 10/14/2023     10/14/2023     Lab Results   Component Value Date    GLUCOSE 157 (H) 10/14/2023    CALCIUM 8.0 (L) 10/14/2023     10/14/2023    K 2.9 (LL) 10/14/2023    CO2 25 10/14/2023     10/14/2023    BUN 11 10/14/2023    CREATININE 0.55 10/14/2023              Assessment/Plan:    Problem List Items Addressed This Visit          Gastrointestinal and Abdominal    * (Principal) Acute appendicitis with perforation, localized peritonitis, and abscess, without gangrene    Relevant Orders    Surgical Pathology Exam    Acute appendicitis - Primary    Relevant Orders    Surgical Pathology Exam     Mari Deluca is a 68 y.o. female who presented to the emergency room with right lower quadrant pain and was found to have acute appendicitis    Acute appendicitis  Status post appendectomy  Drain in place  Surgeon advised continue to monitor until Monday  We will continue Zosyn  Continue to allow for regular diet at discretion of surgeon    Hyperglycemia  Likely related to acute infection  Repeat BMP in a.m.    Hypokalemia  We will replace  Repeat level in a.m.    Acute Anemia  Acute drop possibly related to hemodilution versus intraoperative  Repeat CBC in a.m.  Monitor vitals    Asymptomatic pyuria  Urine culture  pending  On Zosyn due to above    Hypertension  Continue home medication monitor vitals    Hyperlipidemia  Continue statin    DVT prophylaxis  SCDs                10/14/23 at 4:10 PM - Bharat Carpenter DO
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
## 2023-10-14 NOTE — ANESTHESIA PROCEDURE NOTES
Airway  Date/Time: 10/14/2023 11:17 AM  Urgency: elective    Airway not difficult    Staffing  Performed: attending   Authorized by: Andrea Simons MD    Performed by: Andrea Simons MD  Patient location during procedure: OR    Indications and Patient Condition  Indications for airway management: anesthesia and airway protection  Sedation level: deep  Preoxygenated: yes  Patient position: sniffing  Mask difficulty assessment: 1 - vent by mask    Final Airway Details  Final airway type: endotracheal airway      Successful airway: ETT  Cuffed: yes   Successful intubation technique: video laryngoscopy  Blade size: #3  ETT size (mm): 7.0  Cormack-Lehane Classification: grade I - full view of glottis  Placement verified by: chest auscultation and capnometry   Measured from: gums  ETT to gums (cm): 20  Number of attempts at approach: 1

## 2023-10-14 NOTE — ANESTHESIA PREPROCEDURE EVALUATION
Patient: Mari Deluca    Procedure Information       Date/Time: 10/14/23 0900    Procedure: Appendectomy Laparoscopy    Location: [facility] / [facility]    Surgeons: Tito Cabral MD            Relevant Problems   Cardiovascular   (+) Hyperlipidemia   (+) Primary hypertension      Endocrine   (+) Hyperparathyroidism, primary (CMS/HCC)   (+) Prediabetes      Musculoskeletal   (+) Primary osteoarthritis of left knee      Infectious/Inflammatory   (+) Acute appendicitis with perforation, localized peritonitis, and abscess, without gangrene       Clinical information reviewed:   Tobacco  Allergies  Meds  Problems  Med Hx  Surg Hx   Fam Hx  Soc   Hx        NPO Detail:  No data recorded     Physical Exam    Airway  Mallampati: II     Cardiovascular - normal exam     Dental    Pulmonary    Abdominal          Anesthesia Plan    ASA 3 - emergent     general     The patient is not a current smoker.  Patient was not previously instructed to abstain from smoking on day of procedure.  Patient did not smoke on day of procedure.  Education provided regarding risk of obstructive sleep apnea.  intravenous induction   Anesthetic plan and risks discussed with patient.    Plan discussed with CRNA.
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
## 2023-10-14 NOTE — CONSULTS
General Surgery History and Physical    Referring Provider:  DO Stefania Mcdonald, VIET     Chief Complaint:  Abdominal pain    History of Present Illness:  This is a 68 y.o. female who presents with right lower quadrant abdominal pain.  She reports that Thursday at 4 PM she began having suprapubic pain.  It eventually settled in the right lower quadrant.  She reports that she had never had any pain like this previously.  She reports that over the evening and overnight it progressively worsened.  It remained constant.  Early in the morning she and her  decided to proceed to [facility], as they were camping in the area.  Work-up there was consistent with appendicitis, with concerns for perforation.  She was transferred to [facility] for surgeon availability.  She reports feeling bowel movement pressure, but has been unable to have a bowel movement.  She reports an episode of vomiting with subsequent nausea and dry heaves.  She did have a fever.    Past Medical History:  Traumatic brain injury  Kimberly's gangrene of the left labia  Osteoporosis  Right foot bunion  Hyperlipidemia  Osteopenia  Hypertension    Past Surgical History:  Bilateral knee replacement  Breast lumpectomy  Left inguinal debridement  Tracheostomy  [facility] surgery    Medications:  Current Outpatient Medications   Medication Instructions    ascorbic acid (VITAMIN C) 500 mg, oral, Daily RT    atenoloL-chlorthalidone (Tenoretic) 100-25 mg tablet 1 tablet, oral, Daily    cholecalciferol (Vitamin D-3) 50 MCG (2000 UT) tablet oral    HYDROmorphone (Dilaudid) 2 mg tablet 1 tablet, oral, 2 times daily PRN    ibuprofen 800 mg, oral, 2 times daily with meals    lovastatin (Mevacor) 20 mg tablet 1 tablet, oral, Daily with evening meal    lovastatin (MEVACOR) 40 mg, oral, Nightly    multivitamin tablet 1 tablet, oral, Daily RT    NON FORMULARY Dr. Naranjo's Arthritis Relief twice daily.<BR>    omega-3 1,000 mg, oral, Daily RT     potassium chloride CR (Klor-Con M10) 10 mEq ER tablet 10 mEq, oral, Daily    Sutab 1.479-0.188- 0.225 gram tablet TAKE 12 TABLETS BY MOUTH STARTING BETWEEN 6-7PM NIGHT BEFORE PROCEDURE WITH 16OZ OF WATER AS DIRECTED. THEN REPEAT 5 HOURS BEFORE PROCEDURE TIME        Allergies:  Allergies   Allergen Reactions    Lipitor [Atorvastatin] Other     Myalgias        Family History:  Family History   Problem Relation Name Age of Onset    Melanoma Father      Melanoma Daughter      Coronary artery disease Neg Hx          Social History:  .  Retired.  Denies tobacco, alcohol, and illicits.       Review of Systems:  A complete 12 point review of systems was performed and is negative except as noted in the history of present illness.      Vital Signs:  Vitals:    10/14/23 0420   BP: 132/63   Pulse: 90   Resp: 18   Temp: 36.4 °C (97.5 °F)   SpO2: 93%          Physical Exam:  General: No acute distress. Sitting up in bed.   Neuro: Alert and oriented ×3. Follows commands.  Head: Atraumatic  Eyes: Pupils equal reactive to light. Extraocular motions intact.  Ears: Hears normal speaking voice.  Mouth, Nose, Throat: Mucous membranes moist.  Normal dentition.  Neck: Supple. No appreciable masses.  Chest: No crepitus.  No appreciable scars.  Heart: Regular rate and rhythm.  Lung: Clear to auscultation bilaterally.  Vascular: No carotid bruits.  Palpable radial pulses bilaterally.  Abdomen: Soft. Nondistended.  Right lower quadrant tenderness.  Left inguinal scars.  Musculoskeletal: Moves all extremities.  Normal range of motion.  Lymphatic: No palpable lymph nodes.  Skin: No rashes or lesions.  Psychological: Normal affect      Laboratory Values:  CBC:   Lab Results   Component Value Date    WBC 11.7 (H) 10/14/2023    RBC 3.89 (L) 10/14/2023    HGB 11.6 (L) 10/14/2023    HCT 34.8 (L) 10/14/2023     10/14/2023       RFP:   Lab Results   Component Value Date     10/14/2023    K 2.9 (LL) 10/14/2023     10/14/2023  "   CO2 25 10/14/2023    BUN 11 10/14/2023    CREATININE 0.55 10/14/2023    CALCIUM 8.0 (L) 10/14/2023    MG 1.89 10/14/2023        LFTs: No results found for: \"PROT\", \"ALBUMIN\", \"BILITOT\", \"BILIDIR\", \"ALKPHOS\", \"AST\", \"ALT\"         Imaging:  I have personally reviewed the images and the radiologist's report.  CT abdomen pelvis: Appendicitis with concerns for perforation.      Assessment:  This is a 68 y.o. female who presents with right lower quadrant pain and work-up consistent with acute appendicitis.  There is already concerns for perforation on imaging.  We discussed that I recommend a laparoscopic appendectomy.  We discussed that drain placement and discharge planning will be dependent upon what is seen during the operation.      Plan:  -- Continue Zosyn for now  -- Proceed to the operating room for laparoscopic appendectomy      Chilango Cabral MD  General Surgery  Office: [phone]  Fax:     [phone]  9:34 AM   10/14/23     "

## 2023-10-15 LAB
ALBUMIN SERPL BCP-MCNC: 3.1 G/DL (ref 3.4–5)
ANION GAP SERPL CALC-SCNC: 9 MMOL/L (ref 10–20)
BACTERIA UR CULT: NORMAL
BUN SERPL-MCNC: 9 MG/DL (ref 6–23)
CALCIUM SERPL-MCNC: 8.3 MG/DL (ref 8.6–10.3)
CHLORIDE SERPL-SCNC: 103 MMOL/L (ref 98–107)
CO2 SERPL-SCNC: 27 MMOL/L (ref 21–32)
CREAT SERPL-MCNC: 0.54 MG/DL (ref 0.5–1.05)
ERYTHROCYTE [DISTWIDTH] IN BLOOD BY AUTOMATED COUNT: 13.8 % (ref 11.5–14.5)
EST. AVERAGE GLUCOSE BLD GHB EST-MCNC: 117 MG/DL
GFR SERPL CREATININE-BSD FRML MDRD: >90 ML/MIN/1.73M*2
GLUCOSE SERPL-MCNC: 145 MG/DL (ref 74–99)
HBA1C MFR BLD: 5.7 %
HCT VFR BLD AUTO: 37 % (ref 36–46)
HGB BLD-MCNC: 11.8 G/DL (ref 12–16)
MAGNESIUM SERPL-MCNC: 2.29 MG/DL (ref 1.6–2.4)
MCH RBC QN AUTO: 29.4 PG (ref 26–34)
MCHC RBC AUTO-ENTMCNC: 31.9 G/DL (ref 32–36)
MCV RBC AUTO: 92 FL (ref 80–100)
NRBC BLD-RTO: 0 /100 WBCS (ref 0–0)
PHOSPHATE SERPL-MCNC: 1.2 MG/DL (ref 2.5–4.9)
PLATELET # BLD AUTO: 211 X10*3/UL (ref 150–450)
PMV BLD AUTO: 8.6 FL (ref 7.5–11.5)
POTASSIUM SERPL-SCNC: 3.6 MMOL/L (ref 3.5–5.3)
RBC # BLD AUTO: 4.01 X10*6/UL (ref 4–5.2)
SODIUM SERPL-SCNC: 135 MMOL/L (ref 136–145)
WBC # BLD AUTO: 12.9 X10*3/UL (ref 4.4–11.3)

## 2023-10-15 PROCEDURE — 96372 THER/PROPH/DIAG INJ SC/IM: CPT | Performed by: SURGERY

## 2023-10-15 PROCEDURE — 99232 SBSQ HOSP IP/OBS MODERATE 35: CPT | Performed by: FAMILY MEDICINE

## 2023-10-15 PROCEDURE — 83036 HEMOGLOBIN GLYCOSYLATED A1C: CPT | Mod: CMCLAB,GEALAB | Performed by: FAMILY MEDICINE

## 2023-10-15 PROCEDURE — 83735 ASSAY OF MAGNESIUM: CPT | Performed by: SURGERY

## 2023-10-15 PROCEDURE — 2500000004 HC RX 250 GENERAL PHARMACY W/ HCPCS (ALT 636 FOR OP/ED): Performed by: SURGERY

## 2023-10-15 PROCEDURE — 85027 COMPLETE CBC AUTOMATED: CPT | Performed by: FAMILY MEDICINE

## 2023-10-15 PROCEDURE — 1100000001 HC PRIVATE ROOM DAILY

## 2023-10-15 PROCEDURE — 36415 COLL VENOUS BLD VENIPUNCTURE: CPT | Performed by: SURGERY

## 2023-10-15 PROCEDURE — 2500000001 HC RX 250 WO HCPCS SELF ADMINISTERED DRUGS (ALT 637 FOR MEDICARE OP): Performed by: SURGERY

## 2023-10-15 PROCEDURE — 80069 RENAL FUNCTION PANEL: CPT | Performed by: SURGERY

## 2023-10-15 PROCEDURE — 36415 COLL VENOUS BLD VENIPUNCTURE: CPT | Performed by: FAMILY MEDICINE

## 2023-10-15 RX ADMIN — ACETAMINOPHEN 650 MG: 325 TABLET ORAL at 09:17

## 2023-10-15 RX ADMIN — OXYCODONE HYDROCHLORIDE 5 MG: 5 TABLET ORAL at 18:05

## 2023-10-15 RX ADMIN — PIPERACILLIN SODIUM AND TAZOBACTAM SODIUM 4.5 G: 4; .5 INJECTION, SOLUTION INTRAVENOUS at 15:03

## 2023-10-15 RX ADMIN — ONDANSETRON 4 MG: 2 INJECTION INTRAMUSCULAR; INTRAVENOUS at 09:19

## 2023-10-15 RX ADMIN — OXYCODONE HYDROCHLORIDE 5 MG: 5 TABLET ORAL at 05:55

## 2023-10-15 RX ADMIN — ACETAMINOPHEN 650 MG: 325 TABLET ORAL at 17:56

## 2023-10-15 RX ADMIN — PIPERACILLIN SODIUM AND TAZOBACTAM SODIUM 4.5 G: 4; .5 INJECTION, SOLUTION INTRAVENOUS at 20:18

## 2023-10-15 RX ADMIN — ACETAMINOPHEN 650 MG: 325 TABLET ORAL at 15:03

## 2023-10-15 RX ADMIN — DEXTROSE AND SODIUM CHLORIDE 50 ML/HR: 5; 900 INJECTION, SOLUTION INTRAVENOUS at 01:29

## 2023-10-15 RX ADMIN — PRAVASTATIN SODIUM 20 MG: 40 TABLET ORAL at 21:43

## 2023-10-15 RX ADMIN — CHLORTHALIDONE 25 MG: 25 TABLET ORAL at 09:17

## 2023-10-15 RX ADMIN — ENOXAPARIN SODIUM 40 MG: 40 INJECTION SUBCUTANEOUS at 21:43

## 2023-10-15 RX ADMIN — IBUPROFEN 600 MG: 600 TABLET ORAL at 17:56

## 2023-10-15 RX ADMIN — OXYCODONE HYDROCHLORIDE 5 MG: 5 TABLET ORAL at 12:37

## 2023-10-15 RX ADMIN — ATENOLOL 100 MG: 50 TABLET ORAL at 09:17

## 2023-10-15 RX ADMIN — ACETAMINOPHEN 650 MG: 325 TABLET ORAL at 05:54

## 2023-10-15 RX ADMIN — DEXTROSE AND SODIUM CHLORIDE 50 ML/HR: 5; 900 INJECTION, SOLUTION INTRAVENOUS at 23:28

## 2023-10-15 RX ADMIN — ACETAMINOPHEN 650 MG: 325 TABLET ORAL at 21:44

## 2023-10-15 RX ADMIN — IBUPROFEN 600 MG: 600 TABLET ORAL at 12:37

## 2023-10-15 RX ADMIN — PIPERACILLIN SODIUM AND TAZOBACTAM SODIUM 4.5 G: 4; .5 INJECTION, SOLUTION INTRAVENOUS at 09:17

## 2023-10-15 RX ADMIN — Medication 500 MG: at 12:37

## 2023-10-15 RX ADMIN — PIPERACILLIN SODIUM AND TAZOBACTAM SODIUM 4.5 G: 4; .5 INJECTION, SOLUTION INTRAVENOUS at 02:12

## 2023-10-15 RX ADMIN — IBUPROFEN 600 MG: 600 TABLET ORAL at 05:54

## 2023-10-15 RX ADMIN — Medication 500 MG: at 09:17

## 2023-10-15 SDOH — SOCIAL STABILITY: SOCIAL INSECURITY: ARE THERE ANY APPARENT SIGNS OF INJURIES/BEHAVIORS THAT COULD BE RELATED TO ABUSE/NEGLECT?: NO

## 2023-10-15 SDOH — SOCIAL STABILITY: SOCIAL INSECURITY: DO YOU FEEL UNSAFE GOING BACK TO THE PLACE WHERE YOU ARE LIVING?: NO

## 2023-10-15 SDOH — SOCIAL STABILITY: SOCIAL INSECURITY: HAVE YOU HAD THOUGHTS OF HARMING ANYONE ELSE?: NO

## 2023-10-15 SDOH — SOCIAL STABILITY: SOCIAL INSECURITY: HAS ANYONE EVER THREATENED TO HURT YOUR FAMILY OR YOUR PETS?: NO

## 2023-10-15 SDOH — SOCIAL STABILITY: SOCIAL INSECURITY: ABUSE: ADULT

## 2023-10-15 SDOH — SOCIAL STABILITY: SOCIAL INSECURITY: WERE YOU ABLE TO COMPLETE ALL THE BEHAVIORAL HEALTH SCREENINGS?: YES

## 2023-10-15 SDOH — SOCIAL STABILITY: SOCIAL INSECURITY: DOES ANYONE TRY TO KEEP YOU FROM HAVING/CONTACTING OTHER FRIENDS OR DOING THINGS OUTSIDE YOUR HOME?: NO

## 2023-10-15 SDOH — SOCIAL STABILITY: SOCIAL INSECURITY: ARE YOU OR HAVE YOU BEEN THREATENED OR ABUSED PHYSICALLY, EMOTIONALLY, OR SEXUALLY BY ANYONE?: NO

## 2023-10-15 ASSESSMENT — LIFESTYLE VARIABLES
AUDIT-C TOTAL SCORE: 0
HOW OFTEN DO YOU HAVE A DRINK CONTAINING ALCOHOL: NEVER
SKIP TO QUESTIONS 9-10: 1
HOW MANY STANDARD DRINKS CONTAINING ALCOHOL DO YOU HAVE ON A TYPICAL DAY: PATIENT DOES NOT DRINK
AUDIT-C TOTAL SCORE: 0
HOW OFTEN DO YOU HAVE 6 OR MORE DRINKS ON ONE OCCASION: NEVER

## 2023-10-15 ASSESSMENT — PAIN SCALES - GENERAL
PAINLEVEL_OUTOF10: 7
PAINLEVEL_OUTOF10: 7
PAINLEVEL_OUTOF10: 0 - NO PAIN
PAINLEVEL_OUTOF10: 7
PAINLEVEL_OUTOF10: 0 - NO PAIN

## 2023-10-15 ASSESSMENT — PAIN DESCRIPTION - DESCRIPTORS: DESCRIPTORS: ACHING

## 2023-10-15 ASSESSMENT — PATIENT HEALTH QUESTIONNAIRE - PHQ9
SUM OF ALL RESPONSES TO PHQ9 QUESTIONS 1 & 2: 0
2. FEELING DOWN, DEPRESSED OR HOPELESS: NOT AT ALL
1. LITTLE INTEREST OR PLEASURE IN DOING THINGS: NOT AT ALL

## 2023-10-15 ASSESSMENT — COGNITIVE AND FUNCTIONAL STATUS - GENERAL
MOBILITY SCORE: 23
DAILY ACTIVITIY SCORE: 24
CLIMB 3 TO 5 STEPS WITH RAILING: A LITTLE
DAILY ACTIVITIY SCORE: 24
MOBILITY SCORE: 23
CLIMB 3 TO 5 STEPS WITH RAILING: A LITTLE

## 2023-10-15 ASSESSMENT — COLUMBIA-SUICIDE SEVERITY RATING SCALE - C-SSRS
2. HAVE YOU ACTUALLY HAD ANY THOUGHTS OF KILLING YOURSELF?: NO
1. IN THE PAST MONTH, HAVE YOU WISHED YOU WERE DEAD OR WISHED YOU COULD GO TO SLEEP AND NOT WAKE UP?: NO
6. HAVE YOU EVER DONE ANYTHING, STARTED TO DO ANYTHING, OR PREPARED TO DO ANYTHING TO END YOUR LIFE?: NO

## 2023-10-15 ASSESSMENT — PAIN - FUNCTIONAL ASSESSMENT
PAIN_FUNCTIONAL_ASSESSMENT: 0-10
PAIN_FUNCTIONAL_ASSESSMENT: 0-10

## 2023-10-15 NOTE — PROGRESS NOTES
General Surgery Daily Progress Note      Subjective:  The patient is doing well today.  She is no acute distress.  The patient denies nausea and vomiting.  She is tolerating a diet.  Surgical pain is appropriate and is tolerable.  She denies any chest pain or shortness of breath.  She is passing flatus and having bowel movements.        Objective:  Vitals:   Vitals:    10/15/23 1119   BP: 123/63   Pulse: 70   Resp: 16   Temp: 36.3 °C (97.3 °F)   SpO2: 92%       Physical Exam:   General: No acute distress. Sitting up in bed.   Neuro: Alert and oriented ×3. Follows commands.  Head: Atraumatic  Eyes: Pupils equal reactive to light. Extraocular motions intact.  Ears: Hears normal speaking voice.  Neck: Supple. No appreciable masses.  Heart: Regular rate  Lungs: No audible wheeze.  Breathing comfortably.  Abdomen: Soft. Nondistended. Appropriately Tender.  Incision clean, dry, and intact.  NATAN drain with serosanguineous output  Musculoskeletal: Moves all extremities.  Normal range of motion.  Skin: No rashes or lesions.  Psychological: Normal affect        Labs:  CBC:   Lab Results   Component Value Date    WBC 12.9 (H) 10/15/2023    RBC 4.01 10/15/2023    HGB 11.8 (L) 10/15/2023    HCT 37.0 10/15/2023     10/15/2023       RFP:   Lab Results   Component Value Date     (L) 10/15/2023    K 3.6 10/15/2023     10/15/2023    CO2 27 10/15/2023    BUN 9 10/15/2023    CREATININE 0.54 10/15/2023    CALCIUM 8.3 (L) 10/15/2023    MG 2.29 10/15/2023    PHOS 1.2 (L) 10/15/2023        LFTs:   Lab Results   Component Value Date    ALBUMIN 3.1 (L) 10/15/2023              Images:    No new      Assessment:  This is a 68 y.o. year old female who is Post Operative Day #1 from laparoscopic appendectomy for perforated appendicitis.  She has been doing well.      Plan:  -- Regular diet  -- Continue Zosyn while in house  -- Continue NATAN drain today  -- Ambulate in the hallways 5 times a day  -- Plan for likely discharge home  tomorrow with removal of NATAN drain and another 3 days of Augmentin.        Tito Cabral MD  General Surgery  Office: [phone]  Fax: [phone]  2:16 PM  10/15/23

## 2023-10-15 NOTE — PROGRESS NOTES
DAILY PROGRESS NOTE    Subjective:  Patient reports a persistent pressure in her lower abdomen but also reports her pain has improved from yesterday.  She further denies any nausea vomiting or diarrhea and is tolerating a regular diet.      Objective:  Heart Rate:  []   Temp:  [35.8 °C (96.4 °F)-37 °C (98.6 °F)]   Resp:  [15-19]   BP: (112-140)/()   Height:  [152.4 cm (5')]   Weight:  [64.9 kg (143 lb)]   SpO2:  [88 %-99 %]       Intake/Output Summary (Last 24 hours) at 10/15/2023 1035  Last data filed at 10/15/2023 0600  Gross per 24 hour   Intake 1485.84 ml   Output 170 ml   Net 1315.84 ml       Constitutional: patient is alert and cooperative with exam  skin: dry and warm  Eyes: EOMI and clear sclera  ENMT: moist mucous membranes  Head/Neck: NCAT, no JVD  Respiratory/Thorax: Clear to auscultation bilaterally, no wheezing or crackles appreciated  Cardiovascular: regular rate and rhythm, S1 and S2 present, no murmurs heard  Gastrointestinal: bowel sounds present, drain in place with serosanguineous fluid  Extremities: +2 radial, posterior tibial, and pedal pulse, no lower extremity edema noted  Neurological: Alert and orient x3, moves all extremities, no focal deficit        Current Facility-Administered Medications:     acetaminophen (Tylenol) tablet 650 mg, 650 mg, oral, q4h, Tito Cabral MD, 650 mg at 10/15/23 0917    atenolol (Tenormin) tablet 100 mg, 100 mg, oral, Daily, Tito Cabral MD, 100 mg at 10/15/23 0917    chlorthalidone (Hygroton) tablet 25 mg, 25 mg, oral, Daily, Tito Cabral MD, 25 mg at 10/15/23 0917    D5 % and 0.9 % sodium chloride infusion, 50 mL/hr, intravenous, Continuous, Tito Cabral MD, Last Rate: 50 mL/hr at 10/15/23 0501, 50 mL/hr at 10/15/23 0501    enoxaparin (Lovenox) syringe 40 mg, 40 mg, subcutaneous, q24h, Tito Cabral MD, 40 mg at 10/14/23 2142    ibuprofen tablet 600 mg, 600 mg, oral, q6h DONELL, Tito Cabral MD, 600 mg at  10/15/23 0554    melatonin tablet 3 mg, 3 mg, oral, Daily, Tito Cabral MD, 3 mg at 10/13/23 2235    ondansetron (Zofran) injection 4 mg, 4 mg, intravenous, q8h PRN, Tito Cabral MD, 4 mg at 10/15/23 0919    oxyCODONE (Roxicodone) immediate release tablet 5 mg, 5 mg, oral, q4h PRN, Tito Cabral MD, 5 mg at 10/15/23 0555    piperacillin-tazobactam-dextrose (Zosyn) IV 4.5 g, 4.5 g, intravenous, q6h, Tito Cabral MD, Last Rate: 200 mL/hr at 10/15/23 0917, 4.5 g at 10/15/23 0917    potassium phosphate (monobasic) (K-Phos) tablet 500 mg, 500 mg, oral, 4x daily, Tito Cabral MD, 500 mg at 10/15/23 0917    pravastatin (Pravachol) tablet 20 mg, 20 mg, oral, Nightly, Tito Cabral MD, 20 mg at 10/14/23 2142    traMADol (Ultram) tablet 50 mg, 50 mg, oral, q4h PRN, Tito Cabral MD    Lab Results   Component Value Date    WBC 12.9 (H) 10/15/2023    HGB 11.8 (L) 10/15/2023    HCT 37.0 10/15/2023    MCV 92 10/15/2023     10/15/2023     Lab Results   Component Value Date    GLUCOSE 145 (H) 10/15/2023    CALCIUM 8.3 (L) 10/15/2023     (L) 10/15/2023    K 3.6 10/15/2023    CO2 27 10/15/2023     10/15/2023    BUN 9 10/15/2023    CREATININE 0.54 10/15/2023              Assessment/Plan:    Problem List Items Addressed This Visit          Gastrointestinal and Abdominal    * (Principal) Acute appendicitis with perforation, localized peritonitis, and abscess, without gangrene    Relevant Orders    Surgical Pathology Exam    Acute appendicitis - Primary    Relevant Orders    Surgical Pathology Exam     Mari Deluca is a 68 y.o. female who presented to the emergency room with right lower quadrant pain and was found to have acute appendicitis     Acute appendicitis  Status post appendectomy  Drain in place  Surgeon advised continue to monitor until Monday  We will continue Zosyn  Continue to allow for regular diet at discretion of surgeon     Hyperglycemia  /proteinuria  Follow-up with hemoglobin A1c     Hypokalemia  We will replace  Repeat level in a.m.     Acute Anemia  Acute drop possibly related to hemodilution versus intraoperative  Repeat CBC in a.m.  Monitor vitals     Asymptomatic pyuria  Urine culture pending  On Zosyn due to above     Hypertension  Continue home medication monitor vitals     Hyperlipidemia  Continue statin     DVT prophylaxis  SCDs      10/15/23 at 10:35 AM - Bharat Carpenter DO

## 2023-10-15 NOTE — PROGRESS NOTES
Mari Deluca is a 68 y.o. female on day 1 of admission presenting with Acute appendicitis with perforation, localized peritonitis, and abscess, without gangrene.    Subjective   Interval History: no fever, less abdominal pain, some nausea        Review of Systems    Objective   Range of Vitals (last 24 hours)  Heart Rate:  []   Temp:  [35.8 °C (96.4 °F)-37 °C (98.6 °F)]   Resp:  [15-19]   BP: (112-140)/()   Height:  [152.4 cm (5')]   Weight:  [64.9 kg (143 lb)]   SpO2:  [88 %-99 %]   Daily Weight  10/15/23 : 64.9 kg (143 lb)    Body mass index is 27.93 kg/m².    Physical Exam  Constitutional:       Appearance: Normal appearance.   HENT:      Head: Normocephalic and atraumatic.      Mouth/Throat:      Mouth: Mucous membranes are moist.      Pharynx: Oropharynx is clear.   Eyes:      Pupils: Pupils are equal, round, and reactive to light.   Cardiovascular:      Rate and Rhythm: Normal rate and regular rhythm.      Heart sounds: Normal heart sounds.   Pulmonary:      Effort: Pulmonary effort is normal.      Breath sounds: Normal breath sounds.   Abdominal:      General: Abdomen is flat. Bowel sounds are normal.      Palpations: Abdomen is soft.      Comments: Mild distention, mild tenderness   Musculoskeletal:      Cervical back: Normal range of motion.   Neurological:      Mental Status: She is alert.           Antibiotics  atenoloL-chlorthalidone (Tenoretic) 100-25 mg per tablet 1 tablet  lovastatin (Mevacor) tablet 20 mg  magnesium sulfate in D5W IV 1 g  piperacillin-tazobactam-dextrose (Zosyn) IV 4.5 g  ketorolac (Toradol) injection 15 mg  pravastatin (Pravachol) tablet 20 mg  enoxaparin (Lovenox) syringe 40 mg  D5 % and 0.9 % sodium chloride infusion  acetaminophen (Tylenol) tablet 650 mg  ondansetron (Zofran) injection 4 mg  melatonin tablet 3 mg  HYDROmorphone (Dilaudid) injection 0.5 mg  sodium chloride 0.9 % bolus 500 mL  acetaminophen (Tylenol) tablet 650 mg  atenolol (Tenormin) tablet 100  mg  potassium chloride 20 mEq in 100 mL IV premix  HYDROmorphone (Dilaudid) injection 0.2 mg  chlorthalidone (Hygroton) tablet 25 mg  chlorthalidone (Hygroton) tablet 25 mg  potassium chloride 20 mEq in 100 mL IV premix  potassium chloride CR (Klor-Con M20) ER tablet 40 mEq  lidocaine (cardiac) (Xylocaine) 100 mg/5 mL (2 %) injection  - Omnicell Override Pull  dexAMETHasone (Decadron) 4 mg/mL injection  - Omnicell Override Pull  ondansetron (Zofran) 4 mg/2 mL injection  - Omnicell Override Pull  rocuronium (ZeMuron) 10 mg/mL injection  - Omnicell Override Pull  sugammadex (Bridion) 100 mg/mL injection  - Omnicell Override Pull  fentaNYL PF (Sublimaze) 50 mcg/mL injection  - Omnicell Override Pull  midazolam (Versed) 1 mg/mL injection  - Omnicell Override Pull  propofol (Diprivan) 10 mg/mL injection  - Omnicell Override Pull  lactated Ringer's infusion  HYDROmorphone (Dilaudid) injection 0.25 mg  HYDROmorphone (Dilaudid) injection 0.5 mg  ondansetron (Zofran) injection 4 mg  droperidol (Inapsine) injection 0.625 mg  magnesium sulfate IV 2 g  piperacillin-tazobactam-dextrose (Zosyn) IV 3.375 g  rocuronium (ZeMuron) 10 mg/mL injection  - Omnicell Override Pull  dexAMETHasone (Decadron) 4 mg/mL injection  - Omnicell Override Pull  ondansetron (Zofran) 4 mg/2 mL injection  - Omnicell Override Pull  lidocaine (cardiac) (Xylocaine) 100 mg/5 mL (2 %) injection  - Omnicell Override Pull  sugammadex (Bridion) 100 mg/mL injection  - Omnicell Override Pull  bupivacaine PF (Marcaine) 0.5 % (5 mg/mL) injection  D5 % and 0.9 % sodium chloride infusion  ibuprofen tablet 600 mg  traMADol (Ultram) tablet 50 mg  oxyCODONE (Roxicodone) immediate release tablet 5 mg  oxygen (O2) therapy  acetaminophen (Tylenol) tablet 650 mg  acetaminophen (Tylenol) oral liquid 650 mg  acetaminophen (Tylenol) suppository 650 mg  potassium phosphate (monobasic) (K-Phos) tablet 500 mg      Relevant Results  Labs  Results from last 72 hours   Lab Units  "10/15/23  0536 10/14/23  0528 10/13/23  1139   WBC AUTO x10*3/uL 12.9* 11.7* 14.7*   HEMOGLOBIN g/dL 11.8* 11.6* 14.9   HEMATOCRIT % 37.0 34.8* 43.5   PLATELETS AUTO x10*3/uL 211 175 271   NEUTROS PCT AUTO %  --   --  82.5   LYMPHS PCT AUTO %  --   --  9.9   MONOS PCT AUTO %  --   --  7.0   EOS PCT AUTO %  --   --  0.0     Results from last 72 hours   Lab Units 10/15/23  0536 10/14/23  0528 10/13/23  1139   SODIUM mmol/L 135* 138 134*   POTASSIUM mmol/L 3.6 2.9* 3.0*   CHLORIDE mmol/L 103 105 94*   CO2 mmol/L 27 25 26   BUN mg/dL 9 11 15   CREATININE mg/dL 0.54 0.55 0.70   GLUCOSE mg/dL 145* 157* 156*   CALCIUM mg/dL 8.3* 8.0* 10.2   ANION GAP mmol/L 9* 11 17   EGFR mL/min/1.73m*2 >90 >90 >90   PHOSPHORUS mg/dL 1.2*  --   --      Results from last 72 hours   Lab Units 10/15/23  0536 10/13/23  1139   ALK PHOS U/L  --  74   BILIRUBIN TOTAL mg/dL  --  1.5*   PROTEIN TOTAL g/dL  --  7.2   ALT U/L  --  26   AST U/L  --  22   ALBUMIN g/dL 3.1* 4.6     Estimated Creatinine Clearance: 81.6 mL/min (by C-G formula based on SCr of 0.54 mg/dL).  No results found for: \"CRP\"  Microbiology  Reviewed  Imaging  reviewed      Assessment/Plan   SIRS, BC is negative  Appendicitis with localized peritonitis sp appendectomy     Recommendations :  Continue Nabeel     I spent  minutes in the professional and overall care of this patient.      Charlotte Black MD  "

## 2023-10-15 NOTE — CARE PLAN
Problem: Pain  Goal: Walks with improved pain control throughout the shift  Outcome: Progressing   The patient's goals for the shift include      The clinical goals for the shift include pain management    Over the shift, the patient did not make progress toward the following goals. Barriers to progression include pain. Recommendations to address these barriers include medication, positioning.

## 2023-10-16 ENCOUNTER — APPOINTMENT (OUTPATIENT)
Dept: RADIOLOGY | Facility: HOSPITAL | Age: 68
DRG: 397 | End: 2023-10-16
Payer: MEDICARE

## 2023-10-16 LAB
ALBUMIN SERPL BCP-MCNC: 2.7 G/DL (ref 3.4–5)
ANION GAP SERPL CALC-SCNC: 8 MMOL/L (ref 10–20)
BNP SERPL-MCNC: 634 PG/ML (ref 0–99)
BUN SERPL-MCNC: 7 MG/DL (ref 6–23)
CALCIUM SERPL-MCNC: 8.1 MG/DL (ref 8.6–10.3)
CHLORIDE SERPL-SCNC: 104 MMOL/L (ref 98–107)
CO2 SERPL-SCNC: 28 MMOL/L (ref 21–32)
CREAT SERPL-MCNC: 0.53 MG/DL (ref 0.5–1.05)
ERYTHROCYTE [DISTWIDTH] IN BLOOD BY AUTOMATED COUNT: 13.8 % (ref 11.5–14.5)
GFR SERPL CREATININE-BSD FRML MDRD: >90 ML/MIN/1.73M*2
GLUCOSE SERPL-MCNC: 110 MG/DL (ref 74–99)
HCT VFR BLD AUTO: 33.5 % (ref 36–46)
HGB BLD-MCNC: 10.8 G/DL (ref 12–16)
MAGNESIUM SERPL-MCNC: 1.87 MG/DL (ref 1.6–2.4)
MCH RBC QN AUTO: 29.6 PG (ref 26–34)
MCHC RBC AUTO-ENTMCNC: 32.2 G/DL (ref 32–36)
MCV RBC AUTO: 92 FL (ref 80–100)
NRBC BLD-RTO: 0 /100 WBCS (ref 0–0)
PHOSPHATE SERPL-MCNC: 1.7 MG/DL (ref 2.5–4.9)
PLATELET # BLD AUTO: 207 X10*3/UL (ref 150–450)
PMV BLD AUTO: 8.5 FL (ref 7.5–11.5)
POTASSIUM SERPL-SCNC: 3.3 MMOL/L (ref 3.5–5.3)
RBC # BLD AUTO: 3.65 X10*6/UL (ref 4–5.2)
SODIUM SERPL-SCNC: 137 MMOL/L (ref 136–145)
STAPHYLOCOCCUS SPEC CULT: NORMAL
WBC # BLD AUTO: 8.3 X10*3/UL (ref 4.4–11.3)

## 2023-10-16 PROCEDURE — 2500000001 HC RX 250 WO HCPCS SELF ADMINISTERED DRUGS (ALT 637 FOR MEDICARE OP): Performed by: SURGERY

## 2023-10-16 PROCEDURE — 83735 ASSAY OF MAGNESIUM: CPT | Performed by: SURGERY

## 2023-10-16 PROCEDURE — 96372 THER/PROPH/DIAG INJ SC/IM: CPT | Performed by: SURGERY

## 2023-10-16 PROCEDURE — 2500000001 HC RX 250 WO HCPCS SELF ADMINISTERED DRUGS (ALT 637 FOR MEDICARE OP): Performed by: FAMILY MEDICINE

## 2023-10-16 PROCEDURE — 85027 COMPLETE CBC AUTOMATED: CPT | Performed by: SURGERY

## 2023-10-16 PROCEDURE — 71046 X-RAY EXAM CHEST 2 VIEWS: CPT | Performed by: STUDENT IN AN ORGANIZED HEALTH CARE EDUCATION/TRAINING PROGRAM

## 2023-10-16 PROCEDURE — 80069 RENAL FUNCTION PANEL: CPT | Performed by: SURGERY

## 2023-10-16 PROCEDURE — 2500000004 HC RX 250 GENERAL PHARMACY W/ HCPCS (ALT 636 FOR OP/ED): Performed by: FAMILY MEDICINE

## 2023-10-16 PROCEDURE — 71046 X-RAY EXAM CHEST 2 VIEWS: CPT

## 2023-10-16 PROCEDURE — 2500000004 HC RX 250 GENERAL PHARMACY W/ HCPCS (ALT 636 FOR OP/ED): Performed by: SURGERY

## 2023-10-16 PROCEDURE — 83880 ASSAY OF NATRIURETIC PEPTIDE: CPT | Performed by: FAMILY MEDICINE

## 2023-10-16 PROCEDURE — 36415 COLL VENOUS BLD VENIPUNCTURE: CPT | Performed by: FAMILY MEDICINE

## 2023-10-16 PROCEDURE — 1200000002 HC GENERAL ROOM WITH TELEMETRY DAILY

## 2023-10-16 PROCEDURE — 99233 SBSQ HOSP IP/OBS HIGH 50: CPT | Performed by: FAMILY MEDICINE

## 2023-10-16 PROCEDURE — 36415 COLL VENOUS BLD VENIPUNCTURE: CPT | Performed by: SURGERY

## 2023-10-16 RX ORDER — FUROSEMIDE 10 MG/ML
40 INJECTION INTRAMUSCULAR; INTRAVENOUS EVERY 24 HOURS
Status: DISCONTINUED | OUTPATIENT
Start: 2023-10-16 | End: 2023-10-19 | Stop reason: HOSPADM

## 2023-10-16 RX ORDER — POTASSIUM CHLORIDE 20 MEQ/1
40 TABLET, EXTENDED RELEASE ORAL ONCE
Status: COMPLETED | OUTPATIENT
Start: 2023-10-16 | End: 2023-10-16

## 2023-10-16 RX ORDER — AMOXICILLIN AND CLAVULANATE POTASSIUM 875; 125 MG/1; MG/1
875 TABLET, FILM COATED ORAL EVERY 12 HOURS SCHEDULED
Status: DISCONTINUED | OUTPATIENT
Start: 2023-10-16 | End: 2023-10-19 | Stop reason: HOSPADM

## 2023-10-16 RX ADMIN — POTASSIUM CHLORIDE 40 MEQ: 1500 TABLET, EXTENDED RELEASE ORAL at 11:02

## 2023-10-16 RX ADMIN — ACETAMINOPHEN 650 MG: 325 TABLET ORAL at 11:02

## 2023-10-16 RX ADMIN — FUROSEMIDE 40 MG: 10 INJECTION, SOLUTION INTRAVENOUS at 16:02

## 2023-10-16 RX ADMIN — OXYCODONE HYDROCHLORIDE 5 MG: 5 TABLET ORAL at 00:42

## 2023-10-16 RX ADMIN — PIPERACILLIN SODIUM AND TAZOBACTAM SODIUM 4.5 G: 4; .5 INJECTION, SOLUTION INTRAVENOUS at 08:29

## 2023-10-16 RX ADMIN — OXYCODONE HYDROCHLORIDE 5 MG: 5 TABLET ORAL at 11:03

## 2023-10-16 RX ADMIN — ONDANSETRON 4 MG: 2 INJECTION INTRAMUSCULAR; INTRAVENOUS at 11:03

## 2023-10-16 RX ADMIN — PIPERACILLIN SODIUM AND TAZOBACTAM SODIUM 4.5 G: 4; .5 INJECTION, SOLUTION INTRAVENOUS at 01:50

## 2023-10-16 RX ADMIN — ATENOLOL 100 MG: 50 TABLET ORAL at 08:29

## 2023-10-16 RX ADMIN — ACETAMINOPHEN 650 MG: 325 TABLET ORAL at 16:02

## 2023-10-16 RX ADMIN — AMOXICILLIN AND CLAVULANATE POTASSIUM 875 MG: 875; 125 TABLET, FILM COATED ORAL at 20:08

## 2023-10-16 RX ADMIN — ACETAMINOPHEN 650 MG: 325 TABLET ORAL at 23:04

## 2023-10-16 RX ADMIN — PIPERACILLIN SODIUM AND TAZOBACTAM SODIUM 4.5 G: 4; .5 INJECTION, SOLUTION INTRAVENOUS at 14:38

## 2023-10-16 RX ADMIN — ENOXAPARIN SODIUM 40 MG: 40 INJECTION SUBCUTANEOUS at 21:23

## 2023-10-16 RX ADMIN — CHLORTHALIDONE 25 MG: 25 TABLET ORAL at 08:29

## 2023-10-16 RX ADMIN — PRAVASTATIN SODIUM 20 MG: 40 TABLET ORAL at 20:09

## 2023-10-16 RX ADMIN — IBUPROFEN 600 MG: 600 TABLET ORAL at 14:38

## 2023-10-16 RX ADMIN — TRAMADOL HYDROCHLORIDE 50 MG: 50 TABLET, COATED ORAL at 20:08

## 2023-10-16 RX ADMIN — IBUPROFEN 600 MG: 600 TABLET ORAL at 00:00

## 2023-10-16 RX ADMIN — OXYCODONE HYDROCHLORIDE 5 MG: 5 TABLET ORAL at 07:09

## 2023-10-16 ASSESSMENT — PAIN - FUNCTIONAL ASSESSMENT: PAIN_FUNCTIONAL_ASSESSMENT: 0-10

## 2023-10-16 ASSESSMENT — PAIN DESCRIPTION - DESCRIPTORS
DESCRIPTORS: ACHING
DESCRIPTORS: ACHING

## 2023-10-16 ASSESSMENT — COGNITIVE AND FUNCTIONAL STATUS - GENERAL
MOBILITY SCORE: 23
CLIMB 3 TO 5 STEPS WITH RAILING: A LITTLE
DAILY ACTIVITIY SCORE: 24

## 2023-10-16 ASSESSMENT — PAIN SCALES - GENERAL
PAINLEVEL_OUTOF10: 7
PAINLEVEL_OUTOF10: 5 - MODERATE PAIN
PAINLEVEL_OUTOF10: 4
PAINLEVEL_OUTOF10: 7
PAINLEVEL_OUTOF10: 5 - MODERATE PAIN
PAINLEVEL_OUTOF10: 4
PAINLEVEL_OUTOF10: 4
PAINLEVEL_OUTOF10: 8
PAINLEVEL_OUTOF10: 6

## 2023-10-16 ASSESSMENT — ACTIVITIES OF DAILY LIVING (ADL): LACK_OF_TRANSPORTATION: NO

## 2023-10-16 NOTE — PROGRESS NOTES
Mari Deluca is a 68 y.o. female on day 2 of admission presenting with Acute appendicitis with perforation, localized peritonitis, and abscess, without gangrene.    Subjective   Interval History: no fever, less abdominal pain, some nausea        Review of Systems    Objective   Range of Vitals (last 24 hours)  Heart Rate:  [67-80]   Temp:  [35.8 °C (96.4 °F)-36.3 °C (97.3 °F)]   Resp:  [16-18]   BP: (119-134)/(63-77)   SpO2:  [91 %-95 %]   Daily Weight  10/15/23 : 64.9 kg (143 lb)    Body mass index is 27.93 kg/m².    Physical Exam  Constitutional:       Appearance: Normal appearance.   HENT:      Head: Normocephalic and atraumatic.      Mouth/Throat:      Mouth: Mucous membranes are moist.      Pharynx: Oropharynx is clear.   Eyes:      Pupils: Pupils are equal, round, and reactive to light.   Cardiovascular:      Rate and Rhythm: Normal rate and regular rhythm.      Heart sounds: Normal heart sounds.   Pulmonary:      Effort: Pulmonary effort is normal.      Breath sounds: Normal breath sounds.   Abdominal:      General: Abdomen is flat. Bowel sounds are normal.      Palpations: Abdomen is soft.      Comments: Mild distention, mild tenderness   Musculoskeletal:      Cervical back: Normal range of motion.   Neurological:      Mental Status: She is alert.           Antibiotics  atenoloL-chlorthalidone (Tenoretic) 100-25 mg per tablet 1 tablet  lovastatin (Mevacor) tablet 20 mg  magnesium sulfate in D5W IV 1 g  piperacillin-tazobactam-dextrose (Zosyn) IV 4.5 g  ketorolac (Toradol) injection 15 mg  pravastatin (Pravachol) tablet 20 mg  enoxaparin (Lovenox) syringe 40 mg  D5 % and 0.9 % sodium chloride infusion  acetaminophen (Tylenol) tablet 650 mg  ondansetron (Zofran) injection 4 mg  melatonin tablet 3 mg  HYDROmorphone (Dilaudid) injection 0.5 mg  sodium chloride 0.9 % bolus 500 mL  acetaminophen (Tylenol) tablet 650 mg  atenolol (Tenormin) tablet 100 mg  potassium chloride 20 mEq in 100 mL IV  premix  HYDROmorphone (Dilaudid) injection 0.2 mg  chlorthalidone (Hygroton) tablet 25 mg  chlorthalidone (Hygroton) tablet 25 mg  potassium chloride 20 mEq in 100 mL IV premix  potassium chloride CR (Klor-Con M20) ER tablet 40 mEq  lidocaine (cardiac) (Xylocaine) 100 mg/5 mL (2 %) injection  - Omnicell Override Pull  dexAMETHasone (Decadron) 4 mg/mL injection  - Omnicell Override Pull  ondansetron (Zofran) 4 mg/2 mL injection  - Omnicell Override Pull  rocuronium (ZeMuron) 10 mg/mL injection  - Omnicell Override Pull  sugammadex (Bridion) 100 mg/mL injection  - Omnicell Override Pull  fentaNYL PF (Sublimaze) 50 mcg/mL injection  - Omnicell Override Pull  midazolam (Versed) 1 mg/mL injection  - Omnicell Override Pull  propofol (Diprivan) 10 mg/mL injection  - Omnicell Override Pull  lactated Ringer's infusion  HYDROmorphone (Dilaudid) injection 0.25 mg  HYDROmorphone (Dilaudid) injection 0.5 mg  ondansetron (Zofran) injection 4 mg  droperidol (Inapsine) injection 0.625 mg  magnesium sulfate IV 2 g  piperacillin-tazobactam-dextrose (Zosyn) IV 3.375 g  rocuronium (ZeMuron) 10 mg/mL injection  - Omnicell Override Pull  dexAMETHasone (Decadron) 4 mg/mL injection  - Omnicell Override Pull  ondansetron (Zofran) 4 mg/2 mL injection  - Omnicell Override Pull  lidocaine (cardiac) (Xylocaine) 100 mg/5 mL (2 %) injection  - Omnicell Override Pull  sugammadex (Bridion) 100 mg/mL injection  - Omnicell Override Pull  bupivacaine PF (Marcaine) 0.5 % (5 mg/mL) injection  D5 % and 0.9 % sodium chloride infusion  ibuprofen tablet 600 mg  traMADol (Ultram) tablet 50 mg  oxyCODONE (Roxicodone) immediate release tablet 5 mg  oxygen (O2) therapy  acetaminophen (Tylenol) tablet 650 mg  acetaminophen (Tylenol) oral liquid 650 mg  acetaminophen (Tylenol) suppository 650 mg  potassium phosphate (monobasic) (K-Phos) tablet 500 mg      Relevant Results  Labs  Results from last 72 hours   Lab Units 10/16/23  0531 10/15/23  0536 10/14/23  0528  "10/13/23  1139   WBC AUTO x10*3/uL 8.3 12.9* 11.7* 14.7*   HEMOGLOBIN g/dL 10.8* 11.8* 11.6* 14.9   HEMATOCRIT % 33.5* 37.0 34.8* 43.5   PLATELETS AUTO x10*3/uL 207 211 175 271   NEUTROS PCT AUTO %  --   --   --  82.5   LYMPHS PCT AUTO %  --   --   --  9.9   MONOS PCT AUTO %  --   --   --  7.0   EOS PCT AUTO %  --   --   --  0.0       Results from last 72 hours   Lab Units 10/16/23  0531 10/15/23  0536 10/14/23  0528   SODIUM mmol/L 137 135* 138   POTASSIUM mmol/L 3.3* 3.6 2.9*   CHLORIDE mmol/L 104 103 105   CO2 mmol/L 28 27 25   BUN mg/dL 7 9 11   CREATININE mg/dL 0.53 0.54 0.55   GLUCOSE mg/dL 110* 145* 157*   CALCIUM mg/dL 8.1* 8.3* 8.0*   ANION GAP mmol/L 8* 9* 11   EGFR mL/min/1.73m*2 >90 >90 >90   PHOSPHORUS mg/dL 1.7* 1.2*  --        Results from last 72 hours   Lab Units 10/16/23  0531 10/15/23  0536 10/13/23  1139   ALK PHOS U/L  --   --  74   BILIRUBIN TOTAL mg/dL  --   --  1.5*   PROTEIN TOTAL g/dL  --   --  7.2   ALT U/L  --   --  26   AST U/L  --   --  22   ALBUMIN g/dL 2.7* 3.1* 4.6       Estimated Creatinine Clearance: 83.1 mL/min (by C-G formula based on SCr of 0.53 mg/dL).  No results found for: \"CRP\"  Microbiology  Reviewed  Imaging  reviewed      Assessment/Plan   SIRS, BC is negative  Appendicitis with localized peritonitis sp appendectomy     Recommendations :  Continue Zosyn,, plan on oral Augmentin for 3 days with discharge     I spent  minutes in the professional and overall care of this patient.      Charlotte Black MD  "

## 2023-10-16 NOTE — PROGRESS NOTES
General/Trauma Surgery Daily Progress Note    Subjective   POD 2 lap appendectomy. Pain as expected per patient. Doing well, pain tolerable. Tolerating diet. Passing gas and having BM. Ambulates with walker.     Objective   Last Recorded Vitals:  Blood pressure 122/68, pulse 80, temperature 36.1 °C (97 °F), resp. rate 16, height 1.524 m (5'), weight 64.9 kg (143 lb), SpO2 95 %.    Intake/Output last 3 Shifts:  I/O last 3 completed shifts:  In: 3610 (55.7 mL/kg) [P.O.:240; I.V.:2470 (38.1 mL/kg); IV Piggyback:900]  Out: 255 (3.9 mL/kg) [Drains:255]  Weight: 64.9 kg     Pain Score:  Pain Score: 4     Physical Exam:  Constitutional: No acute distress, sitting up in bed.  Neuro: Alert, oriented. Follows commands.   Eyes: EOMI. No scleral icterus. Conjunctiva pink.  ENT: MMM.   Heart: Regular rate.  Respiratory: No increased work of breathing or audible wheeze.  Abdomen: Soft, nondistended. Appropriately tender. Incisions clean, dry, intact. NATAN serosanguinous  MSK: Moves all extremities.  Vascular: Palpable pulses throughout, no pitting edema.  Skin: No rashes.   Psychological: Appropriate mood and behavior.            Relevant Results  Laboratory Results:  CBC:   Lab Results   Component Value Date    WBC 8.3 10/16/2023    RBC 3.65 (L) 10/16/2023    HGB 10.8 (L) 10/16/2023    HCT 33.5 (L) 10/16/2023     10/16/2023       RFP:   Lab Results   Component Value Date     10/16/2023    K 3.3 (L) 10/16/2023     10/16/2023    CO2 28 10/16/2023    BUN 7 10/16/2023    CREATININE 0.53 10/16/2023    CALCIUM 8.1 (L) 10/16/2023    MG 1.87 10/16/2023    PHOS 1.7 (L) 10/16/2023        LFTs:   Lab Results   Component Value Date    ALBUMIN 2.7 (L) 10/16/2023              Assessment/Plan   This is a 68 y.o. female POD 2 lap appendectomy for perforated appendicitis. Drain removed today.       Plan:   -- OK for DC from surgical standpoint  -- DC instructions updated  -- Follow up in 2-3 weeks  -- Plan 3 days Augmentin at  discharge  -- Ambulate at home, no heavy lifting > 10 lbs for 4 weeks  -- OK to shower, no tubsoaks/swimming x 2 weeks  -- Advance diet as tolerated      Seen and discussed with Dr. Cabral who is in agreement with plan. Please doc halo with questions.    Kailey Gonsales PA-C

## 2023-10-16 NOTE — CARE PLAN
The patient's goals for the shift include      Problem: Pain  Goal: Turns in bed with improved pain control throughout the shift  10/15/2023 2154 by Kimi Montes RN  Outcome: Progressing  10/15/2023 2154 by Kimi Montes RN  Outcome: Progressing     Problem: Pain  Goal: Takes deep breaths with improved pain control throughout the shift  10/15/2023 2154 by Kimi Montes RN  Outcome: Progressing  10/15/2023 2154 by Kimi Montes RN  Outcome: Progressing     Problem: Pain  Goal: Walks with improved pain control throughout the shift  10/15/2023 2154 by Kimi Montes RN  Outcome: Progressing  10/15/2023 2154 by Kimi Montes RN  Outcome: Progressing     Problem: Safety - Adult  Goal: Free from fall injury  Outcome: Progressing       The clinical goals for the shift include pain management    Over the shift, the patient did not make progress toward the following goals. Barriers to progression include pain. Recommendations to address these barriers include pain meds.

## 2023-10-16 NOTE — CARE PLAN
The patient's goals for the shift include  ambulate    The clinical goals for the shift include pain managment    Over the shift, the patient did not make progress toward the following goals. Barriers to progression include post op pain. Recommendations to address these barriers include manage pain and promote mobility.

## 2023-10-16 NOTE — PROGRESS NOTES
DAILY PROGRESS NOTE    Subjective:  Patient reported to mild abdominal pain that has improved from admission after surgery but still persist.  She has been able to tolerate a diet with multiple loose stools.        Objective:  Heart Rate:  [67-80]   Temp:  [35.8 °C (96.4 °F)-36.1 °C (97 °F)]   Resp:  [16-18]   BP: (120-134)/(68-77)   SpO2:  [91 %-95 %]       Intake/Output Summary (Last 24 hours) at 10/16/2023 1543  Last data filed at 10/16/2023 0559  Gross per 24 hour   Intake 2694.16 ml   Output 85 ml   Net 2609.16 ml         Constitutional: patient is alert and cooperative with exam  skin: dry and warm  Eyes: EOMI and clear sclera  ENMT: moist mucous membranes  Head/Neck: NCAT, no JVD  Respiratory/Thorax: Clear to auscultation bilaterally, no wheezing or crackles appreciated  Cardiovascular: regular rate and rhythm, S1 and S2 present, no murmurs heard  Gastrointestinal: bowel sounds present, drain in place with serosanguineous fluid  Extremities: +2 radial, posterior tibial, and pedal pulse, no lower extremity edema noted  Neurological: Alert and orient x3, moves all extremities, no focal deficit        Current Facility-Administered Medications:     acetaminophen (Tylenol) tablet 650 mg, 650 mg, oral, q4h, Tito Cabral MD, 650 mg at 10/16/23 1102    atenolol (Tenormin) tablet 100 mg, 100 mg, oral, Daily, Tito Cabral MD, 100 mg at 10/16/23 0829    chlorthalidone (Hygroton) tablet 25 mg, 25 mg, oral, Daily, Tito Cabral MD, 25 mg at 10/16/23 0829    enoxaparin (Lovenox) syringe 40 mg, 40 mg, subcutaneous, q24h, Tito Cabral MD, 40 mg at 10/15/23 2143    furosemide (Lasix) injection 40 mg, 40 mg, intravenous, q24h, Bharat Carpenter DO    ibuprofen tablet 600 mg, 600 mg, oral, q6h [facility], Tito Cabral MD, 600 mg at 10/16/23 1438    melatonin tablet 3 mg, 3 mg, oral, Daily, Tito Cabral MD, 3 mg at 10/13/23 3033    ondansetron (Zofran) injection 4 mg, 4 mg, intravenous,  q8h PRN, Tito Cabral MD, 4 mg at 10/16/23 1103    oxyCODONE (Roxicodone) immediate release tablet 5 mg, 5 mg, oral, q4h PRN, Tito Cabral MD, 5 mg at 10/16/23 1103    piperacillin-tazobactam-dextrose (Zosyn) IV 4.5 g, 4.5 g, intravenous, q6h, Tito Cabral MD, Stopped at 10/16/23 1508    pravastatin (Pravachol) tablet 20 mg, 20 mg, oral, Nightly, Tito Cabral MD, 20 mg at 10/15/23 2143    traMADol (Ultram) tablet 50 mg, 50 mg, oral, q4h PRN, Tito Cabral MD    Lab Results   Component Value Date    WBC 8.3 10/16/2023    HGB 10.8 (L) 10/16/2023    HCT 33.5 (L) 10/16/2023    MCV 92 10/16/2023     10/16/2023     Lab Results   Component Value Date    GLUCOSE 110 (H) 10/16/2023    CALCIUM 8.1 (L) 10/16/2023     10/16/2023    K 3.3 (L) 10/16/2023    CO2 28 10/16/2023     10/16/2023    BUN 7 10/16/2023    CREATININE 0.53 10/16/2023              Assessment/Plan:    Problem List Items Addressed This Visit          Gastrointestinal and Abdominal    * (Principal) Acute appendicitis with perforation, localized peritonitis, and abscess, without gangrene    Relevant Orders    Surgical Pathology Exam    Acute appendicitis - Primary    Relevant Orders    Surgical Pathology Exam     Other Visit Diagnoses       Acute respiratory failure with hypoxia (CMS/HCC)        Relevant Orders    Transthoracic Echo (TTE) Complete          Mari Deluca is a 68 y.o. female who presented to the emergency room with right lower quadrant pain and was found to have acute appendicitis     Acute hypoxic respiratory failure due to pulmonary edema/pleural effusion  Patient asymptomatic but hypoxic wants nasal cannula removed  Chest x-ray revealed to the small to moderate bilateral pleural effusions and interstitial thickening  Patient also reports mild dyspnea on exertion but denies orthopnea edema or weight gain  Follow-up with ECHO and BNP  Start Lasix 40mg IV every day   Follow I's and  O's  DC IVF  Consult Cardiology    Acute appendicitis  Status post appendectomy  Drain removed  Cleared by surgery infectious disease for discharge  3 days of oral Augmentin recommended     Glucose Intolerance/proteinuria  Hemoglobin A1c 5.7     Hypokalemia  We will replace  Repeat level in a.m.     Acute Anemia  Acute drop possibly related to hemodilution versus intraoperative  Repeat CBC in a.m.  Monitor vitals     Asymptomatic pyuria  Urine culture grew normal genitourinary ruthie     Hypertension  Continue home medication monitor vitals     Hyperlipidemia  Continue statin     DVT prophylaxis  SCDs      10/16/23 at 3:43 PM - Bharat Carpenter DO

## 2023-10-16 NOTE — DISCHARGE INSTRUCTIONS
PATIENT INSTRUCTIONS  APPENDICITIS    FOLLOW-UP: Please call the office after being discharged to make a follow up appointment in 2-3 weeks. Call your physician immediately if you have any fevers greater than 103 degrees Fahrenheit, drainage from your wound that is not clear or looks infected, persistent bleeding, increasing abdominal pain,  or persistent nausea/vomiting. Call to schedule at [phone]. Our office is located in the [facility] inside [facility].    WOUND CARE INSTRUCTIONS: Incisions are closed with absorbable sutures and covered with glue. Glue will fall off in about 2 weeks. If the wound becomes bright red and painful or starts to drain infected material that is not clear, please contact your physician immediately. If the wound is mildly pink and has a thick firm ridge underneath it, this is normal and is referred to as a healing ridge. This will resolve over the next 4-6 weeks. You are welcome to shower the day after surgery. Wash abdomen with warm, soapy water using your hand or gentle wash cloth. Pat dry. Do not submerge incisions in a bath tub or swimming pool for 2 weeks following surgery.    DRAIN: If your surgeon discharges you with a drain, you will need to empty it when it becomes 1/3 full. Empty it into a graduated specimen cup and record total outputs for the day. Bring these recordings to your follow up appointment. Strip your drain once a day to prevent clogging. Your drain will likely be removed in 1 week in office. The drain fluid should turn from a pink, watery fluid to a straw yellow, watery fluid. If it turns green, brown, dark red and thick or develops a bad odor, call our office immediately. IF your drain was removed prior to discharge, leave the site covered with your tegaderm dressing x 2 days and then remove to leave open to air. It is normal for the drain site to ooze a bit of fluid prior to it scabbing over.    DIET: You may eat any foods that you can tolerate.  We recommend following a bland, easily digestible diet for the first few days after surgery until your appetite improves. It is a good idea to eat a high fiber diet, and take in plenty of fluids to prevent constipation. Take Miralax daily if experiencing constipation after surgery until stools are a pudding like consistency and easy to pass.     ACTIVITY: You are encouraged to cough and take deep breaths or use the incentive spirometry that was provided. This will help prevent respiratory complications and low grade fevers post-operatively. You may want to hug a pillow when coughing and sneezing to add additional support to the surgical area which will decrease pain during these times. You should not lift more than 10 pounds for 4 weeks after surgery as it could put you at increased risk for a post-operative hernia. We encourage frequent ambulation and getting out of bed as much as possible while you recover to improve your recovery process. Avoid strenuous activity for 4 weeks, which includes exercise that increases the heart rate, breathing rate, or makes you break a sweat.     MEDICATIONS: Plan to take Tylenol and Ibuprofen (if your physician approves) every 6 hours for the first week after surgery. Alternatively, you can alternate these two medications every three hours for the first week. You will be provided a short course of a narcotic medication to take for breakthrough pain as needed. You should not drive, make important decisions, or operate machinery when taking narcotic pain medication.    QUESTIONS: Please feel free to call your physician's office for any questions or concerns following surgery. Our office number is [phone].

## 2023-10-16 NOTE — PROGRESS NOTES
10/16/23 1034   Discharge Planning   Living Arrangements Spouse/significant other   Support Systems Spouse/significant other   Assistance Needed Patient is A&O X3, independent with ADL's, uses no DME. Patient does not drive- spouse transports patient.   Type of Residence Private residence   Number of Stairs to Enter Residence 2   Number of Stairs Within Residence 0   Do you have animals or pets at home? Yes   Type of Animals or Pets Cat   Who is requesting discharge planning? Provider   Home or Post Acute Services None   Patient expects to be discharged to: Home with spouse and no DC needs   Does the patient need discharge transport arranged? No   Financial Resource Strain   How hard is it for you to pay for the very basics like food, housing, medical care, and heating? Not hard   Housing Stability   In the last 12 months, was there a time when you were not able to pay the mortgage or rent on time? N   In the last 12 months, how many places have you lived? 1   In the last 12 months, was there a time when you did not have a steady place to sleep or slept in a shelter (including now)? N   Transportation Needs   In the past 12 months, has lack of transportation kept you from medical appointments or from getting medications? no   In the past 12 months, has lack of transportation kept you from meetings, work, or from getting things needed for daily living? No

## 2023-10-17 ENCOUNTER — APPOINTMENT (OUTPATIENT)
Dept: CARDIOLOGY | Facility: HOSPITAL | Age: 68
DRG: 397 | End: 2023-10-17
Payer: MEDICARE

## 2023-10-17 LAB
ANION GAP SERPL CALC-SCNC: 11 MMOL/L (ref 10–20)
BACTERIA BLD CULT: NORMAL
BUN SERPL-MCNC: 5 MG/DL (ref 6–23)
CALCIUM SERPL-MCNC: 8.9 MG/DL (ref 8.6–10.3)
CHLORIDE SERPL-SCNC: 98 MMOL/L (ref 98–107)
CO2 SERPL-SCNC: 31 MMOL/L (ref 21–32)
CREAT SERPL-MCNC: 0.48 MG/DL (ref 0.5–1.05)
GFR SERPL CREATININE-BSD FRML MDRD: >90 ML/MIN/1.73M*2
GLUCOSE SERPL-MCNC: 110 MG/DL (ref 74–99)
POTASSIUM SERPL-SCNC: 3.2 MMOL/L (ref 3.5–5.3)
SODIUM SERPL-SCNC: 137 MMOL/L (ref 136–145)

## 2023-10-17 PROCEDURE — 2500000004 HC RX 250 GENERAL PHARMACY W/ HCPCS (ALT 636 FOR OP/ED): Performed by: SURGERY

## 2023-10-17 PROCEDURE — 2500000001 HC RX 250 WO HCPCS SELF ADMINISTERED DRUGS (ALT 637 FOR MEDICARE OP): Performed by: SURGERY

## 2023-10-17 PROCEDURE — 82310 ASSAY OF CALCIUM: CPT | Performed by: FAMILY MEDICINE

## 2023-10-17 PROCEDURE — 93306 TTE W/DOPPLER COMPLETE: CPT | Performed by: INTERNAL MEDICINE

## 2023-10-17 PROCEDURE — 96372 THER/PROPH/DIAG INJ SC/IM: CPT | Performed by: SURGERY

## 2023-10-17 PROCEDURE — 2500000004 HC RX 250 GENERAL PHARMACY W/ HCPCS (ALT 636 FOR OP/ED): Performed by: NURSE PRACTITIONER

## 2023-10-17 PROCEDURE — 2500000001 HC RX 250 WO HCPCS SELF ADMINISTERED DRUGS (ALT 637 FOR MEDICARE OP): Performed by: FAMILY MEDICINE

## 2023-10-17 PROCEDURE — 2500000004 HC RX 250 GENERAL PHARMACY W/ HCPCS (ALT 636 FOR OP/ED): Performed by: FAMILY MEDICINE

## 2023-10-17 PROCEDURE — 36415 COLL VENOUS BLD VENIPUNCTURE: CPT | Performed by: FAMILY MEDICINE

## 2023-10-17 PROCEDURE — 99222 1ST HOSP IP/OBS MODERATE 55: CPT | Performed by: INTERNAL MEDICINE

## 2023-10-17 PROCEDURE — 1200000002 HC GENERAL ROOM WITH TELEMETRY DAILY

## 2023-10-17 PROCEDURE — 99232 SBSQ HOSP IP/OBS MODERATE 35: CPT | Performed by: NURSE PRACTITIONER

## 2023-10-17 PROCEDURE — 93306 TTE W/DOPPLER COMPLETE: CPT

## 2023-10-17 RX ORDER — POTASSIUM CHLORIDE 20 MEQ/1
40 TABLET, EXTENDED RELEASE ORAL ONCE
Status: COMPLETED | OUTPATIENT
Start: 2023-10-17 | End: 2023-10-17

## 2023-10-17 RX ADMIN — ATENOLOL 100 MG: 50 TABLET ORAL at 09:25

## 2023-10-17 RX ADMIN — IBUPROFEN 600 MG: 600 TABLET ORAL at 06:29

## 2023-10-17 RX ADMIN — ACETAMINOPHEN 650 MG: 325 TABLET ORAL at 09:27

## 2023-10-17 RX ADMIN — FUROSEMIDE 40 MG: 10 INJECTION, SOLUTION INTRAVENOUS at 15:58

## 2023-10-17 RX ADMIN — CHLORTHALIDONE 25 MG: 25 TABLET ORAL at 09:25

## 2023-10-17 RX ADMIN — IBUPROFEN 600 MG: 600 TABLET ORAL at 17:24

## 2023-10-17 RX ADMIN — POTASSIUM CHLORIDE 40 MEQ: 1500 TABLET, EXTENDED RELEASE ORAL at 09:32

## 2023-10-17 RX ADMIN — TRAMADOL HYDROCHLORIDE 50 MG: 50 TABLET, COATED ORAL at 03:47

## 2023-10-17 RX ADMIN — IBUPROFEN 600 MG: 600 TABLET ORAL at 12:35

## 2023-10-17 RX ADMIN — IBUPROFEN 600 MG: 600 TABLET ORAL at 23:54

## 2023-10-17 RX ADMIN — ENOXAPARIN SODIUM 40 MG: 40 INJECTION SUBCUTANEOUS at 20:42

## 2023-10-17 RX ADMIN — ACETAMINOPHEN 650 MG: 325 TABLET ORAL at 17:24

## 2023-10-17 RX ADMIN — ACETAMINOPHEN 650 MG: 325 TABLET ORAL at 21:52

## 2023-10-17 RX ADMIN — MELATONIN TAB 3 MG 3 MG: 3 TAB at 20:43

## 2023-10-17 RX ADMIN — AMOXICILLIN AND CLAVULANATE POTASSIUM 875 MG: 875; 125 TABLET, FILM COATED ORAL at 09:25

## 2023-10-17 RX ADMIN — AMOXICILLIN AND CLAVULANATE POTASSIUM 875 MG: 875; 125 TABLET, FILM COATED ORAL at 20:42

## 2023-10-17 RX ADMIN — ACETAMINOPHEN 650 MG: 325 TABLET ORAL at 03:39

## 2023-10-17 RX ADMIN — IBUPROFEN 600 MG: 600 TABLET ORAL at 00:33

## 2023-10-17 RX ADMIN — PRAVASTATIN SODIUM 20 MG: 40 TABLET ORAL at 20:42

## 2023-10-17 RX ADMIN — ACETAMINOPHEN 650 MG: 325 TABLET ORAL at 14:23

## 2023-10-17 ASSESSMENT — COGNITIVE AND FUNCTIONAL STATUS - GENERAL
DAILY ACTIVITIY SCORE: 24
MOBILITY SCORE: 23
CLIMB 3 TO 5 STEPS WITH RAILING: A LITTLE
MOBILITY SCORE: 23
CLIMB 3 TO 5 STEPS WITH RAILING: A LITTLE
DAILY ACTIVITIY SCORE: 24

## 2023-10-17 ASSESSMENT — PAIN SCALES - GENERAL
PAINLEVEL_OUTOF10: 5 - MODERATE PAIN
PAINLEVEL_OUTOF10: 5 - MODERATE PAIN
PAINLEVEL_OUTOF10: 4
PAINLEVEL_OUTOF10: 6
PAINLEVEL_OUTOF10: 4

## 2023-10-17 ASSESSMENT — ENCOUNTER SYMPTOMS
SHORTNESS OF BREATH: 0
DIZZINESS: 0
HEADACHES: 0
CHILLS: 0
WHEEZING: 0
FEVER: 0
ABDOMINAL PAIN: 0

## 2023-10-17 ASSESSMENT — PAIN - FUNCTIONAL ASSESSMENT: PAIN_FUNCTIONAL_ASSESSMENT: 0-10
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
## 2023-10-17 NOTE — PROGRESS NOTES
Mari Deluca is a 68 y.o. female on day 3 of admission presenting with Acute appendicitis with perforation, localized peritonitis, and abscess, without gangrene.      Subjective   Seen and examined in her room this AM. Awake and alert. Up in chair at bedside. States her breathing has improved. Only used the oxygen at night. She denies chest pain, N/V/D/C, fever, or chills.         Objective     Last Recorded Vitals  /83   Pulse 68   Temp 36.6 °C (97.9 °F)   Resp 17   Wt 64.9 kg (143 lb)   SpO2 94%   Intake/Output last 3 Shifts:    Intake/Output Summary (Last 24 hours) at 10/17/2023 1533  Last data filed at 10/17/2023 1336  Gross per 24 hour   Intake 1120 ml   Output 4703 ml   Net -3583 ml       Admission Weight  Weight: 64.9 kg (143 lb) (10/15/23 0506)    Daily Weight  10/15/23 : 64.9 kg (143 lb)    Image Results      Physical Exam  Constitutional: A&O x 3; NAD; calm and cooperative  Eyes: EOM's intact  HEENT: Normocephalic, Atraumatic. Oral mucosa moist.   Neck: Supple. No JVD, lymphadenopathy.   Lungs: CTAB with fair air movement. Respirations even and unlabored on room air.   Heart: RRR  Abdomen: Soft, non-tender, non-distended, +BS  MS/Extremities: ROMANO equally x 4. No edema. Peripheral pulses intact bilaterally.   Neuro: A&O x3; no focal deficits; gross motor and sensation intact.   Skin: Warm and dry. No rashes or lesions  Psych: Normal affect.       Relevant Results  Scheduled medications  acetaminophen, 650 mg, oral, q4h  amoxicillin-pot clavulanate, 875 mg, oral, q12h DONELL  atenolol, 100 mg, oral, Daily  chlorthalidone, 25 mg, oral, Daily  enoxaparin, 40 mg, subcutaneous, q24h  furosemide, 40 mg, intravenous, q24h  ibuprofen, 600 mg, oral, q6h DONELL  melatonin, 3 mg, oral, Daily  pravastatin, 20 mg, oral, Nightly      Continuous medications     PRN medications  PRN medications: ondansetron, oxyCODONE, traMADol     Results for orders placed or performed during the hospital encounter of 10/13/23  (from the past 24 hour(s))   B-type natriuretic peptide   Result Value Ref Range     (H) 0 - 99 pg/mL   Basic Metabolic Panel   Result Value Ref Range    Glucose 110 (H) 74 - 99 mg/dL    Sodium 137 136 - 145 mmol/L    Potassium 3.2 (L) 3.5 - 5.3 mmol/L    Chloride 98 98 - 107 mmol/L    Bicarbonate 31 21 - 32 mmol/L    Anion Gap 11 10 - 20 mmol/L    Urea Nitrogen 5 (L) 6 - 23 mg/dL    Creatinine 0.48 (L) 0.50 - 1.05 mg/dL    eGFR >90 >60 mL/min/1.73m*2    Calcium 8.9 8.6 - 10.3 mg/dL   Transthoracic Echo (TTE) Complete   Result Value Ref Range    BSA 1.66 m2       Assessment/Plan   69 y/o female with a medical history of HTN, Hyperlipidemia who presented to [facility] with RLQ pain and found to have acute appendicitis with perforation.     Acute Appendicitis with Perforation  -S/p appendectomy by Dr. Cabral on 10/14  -ID and surgery are following  -Drain removed   -Advancing diet as tolerates  -Medicate for pain as needed  -Encouraged IS use, mobilization  -Maintain bowel regimen  -Preliminary blood cultures negative  -Afebrile.   -CBC in AM.     Acute Hypoxic Respiratory Failure - Improved  -Secondary to pulmonary edema/pleural effuxion  -CXR: small to moderate bilateral pleural effusions and interstitial thickening   -Echocardiogram - pending interpretation  -Diuresis with IV Lasix  -Cardiology is following  -Reports clinical improvement and on exam appears euvolemic    Hypertension  -Medical therapy: Atenolol, Chlorthalidone    Hyperlipidemia  -On statin therapy    Acute Anemia  -Likely related to surgery, infection, and possible hemodilution.    DVT Prophylaxis  -Lovenox    Fluids/Electrolytes/Nutrition  -Laboratory data reviewed.   -Electrolytes stable. Hypokalemia - replaced.   -No nutritional concerns at this time.     Disposition  -Plan of care discussed with attending, Dr. Toure.   -Tentative plan for discharge to home in AM if hemodynamically stable. Await Echo results.      Megan Carballo,  APRN-CNP

## 2023-10-17 NOTE — CARE PLAN
The patient's goals for the shift include      The clinical goals for the shift include PT will have improved mobility throughout shift with better pain control      Problem: Pain  Goal: Takes deep breaths with improved pain control throughout the shift  Outcome: Progressing  Goal: Turns in bed with improved pain control throughout the shift  Outcome: Progressing  Goal: Walks with improved pain control throughout the shift  Outcome: Progressing  Goal: Performs ADL's with improved pain control throughout shift  Outcome: Progressing  Goal: Participates in PT with improved pain control throughout the shift  Outcome: Progressing  Goal: Free from opioid side effects throughout the shift  Outcome: Progressing  Goal: Free from acute confusion related to pain meds throughout the shift  Outcome: Progressing     Problem: Pain - Adult  Goal: Verbalizes/displays adequate comfort level or baseline comfort level  Outcome: Progressing     Problem: Safety - Adult  Goal: Free from fall injury  Outcome: Progressing     Problem: Discharge Planning  Goal: Discharge to home or other facility with appropriate resources  Outcome: Progressing     Problem: Chronic Conditions and Co-morbidities  Goal: Patient's chronic conditions and co-morbidity symptoms are monitored and maintained or improved  Outcome: Progressing

## 2023-10-17 NOTE — CARE PLAN
The patient's goals for the shift include      The clinical goals for the shift include improve mobility    Over the shift, the patient did make progress toward the following goals. Barriers to progression include pain management . Recommendations to address these barriers include pain medication and other non pharmacological measures.     [facility]    [phone]

## 2023-10-17 NOTE — PROGRESS NOTES
Mari Deluca is a 68 y.o. female on day 3 of admission presenting with Acute appendicitis with perforation, localized peritonitis, and abscess, without gangrene.    Subjective   Interval History: no fever, less abdominal pain, no nausea        Review of Systems    Objective   Range of Vitals (last 24 hours)  Heart Rate:  [60-72]   Temp:  [35.4 °C (95.7 °F)-36.7 °C (98.1 °F)]   Resp:  [16-18]   BP: (118-138)/(59-75)   SpO2:  [91 %-94 %]   Daily Weight  10/15/23 : 64.9 kg (143 lb)    Body mass index is 27.93 kg/m².    Physical Exam  Constitutional:       Appearance: Normal appearance.   HENT:      Head: Normocephalic and atraumatic.      Mouth/Throat:      Mouth: Mucous membranes are moist.      Pharynx: Oropharynx is clear.   Eyes:      Pupils: Pupils are equal, round, and reactive to light.   Cardiovascular:      Rate and Rhythm: Normal rate and regular rhythm.      Heart sounds: Normal heart sounds.   Pulmonary:      Effort: Pulmonary effort is normal.      Breath sounds: Normal breath sounds.   Abdominal:      General: Abdomen is flat. Bowel sounds are normal.      Palpations: Abdomen is soft.      Comments: Mild distention, mild tenderness   Musculoskeletal:      Cervical back: Normal range of motion.   Neurological:      Mental Status: She is alert.           Antibiotics  atenoloL-chlorthalidone (Tenoretic) 100-25 mg per tablet 1 tablet  lovastatin (Mevacor) tablet 20 mg  magnesium sulfate in D5W IV 1 g  piperacillin-tazobactam-dextrose (Zosyn) IV 4.5 g  ketorolac (Toradol) injection 15 mg  pravastatin (Pravachol) tablet 20 mg  enoxaparin (Lovenox) syringe 40 mg  D5 % and 0.9 % sodium chloride infusion  acetaminophen (Tylenol) tablet 650 mg  ondansetron (Zofran) injection 4 mg  melatonin tablet 3 mg  HYDROmorphone (Dilaudid) injection 0.5 mg  sodium chloride 0.9 % bolus 500 mL  acetaminophen (Tylenol) tablet 650 mg  atenolol (Tenormin) tablet 100 mg  potassium chloride 20 mEq in 100 mL IV  premix  HYDROmorphone (Dilaudid) injection 0.2 mg  chlorthalidone (Hygroton) tablet 25 mg  chlorthalidone (Hygroton) tablet 25 mg  potassium chloride 20 mEq in 100 mL IV premix  potassium chloride CR (Klor-Con M20) ER tablet 40 mEq  lidocaine (cardiac) (Xylocaine) 100 mg/5 mL (2 %) injection  - Omnicell Override Pull  dexAMETHasone (Decadron) 4 mg/mL injection  - Omnicell Override Pull  ondansetron (Zofran) 4 mg/2 mL injection  - Omnicell Override Pull  rocuronium (ZeMuron) 10 mg/mL injection  - Omnicell Override Pull  sugammadex (Bridion) 100 mg/mL injection  - Omnicell Override Pull  fentaNYL PF (Sublimaze) 50 mcg/mL injection  - Omnicell Override Pull  midazolam (Versed) 1 mg/mL injection  - Omnicell Override Pull  propofol (Diprivan) 10 mg/mL injection  - Omnicell Override Pull  lactated Ringer's infusion  HYDROmorphone (Dilaudid) injection 0.25 mg  HYDROmorphone (Dilaudid) injection 0.5 mg  ondansetron (Zofran) injection 4 mg  droperidol (Inapsine) injection 0.625 mg  magnesium sulfate IV 2 g  piperacillin-tazobactam-dextrose (Zosyn) IV 3.375 g  rocuronium (ZeMuron) 10 mg/mL injection  - Omnicell Override Pull  dexAMETHasone (Decadron) 4 mg/mL injection  - Omnicell Override Pull  ondansetron (Zofran) 4 mg/2 mL injection  - Omnicell Override Pull  lidocaine (cardiac) (Xylocaine) 100 mg/5 mL (2 %) injection  - Omnicell Override Pull  sugammadex (Bridion) 100 mg/mL injection  - Omnicell Override Pull  bupivacaine PF (Marcaine) 0.5 % (5 mg/mL) injection  D5 % and 0.9 % sodium chloride infusion  ibuprofen tablet 600 mg  traMADol (Ultram) tablet 50 mg  oxyCODONE (Roxicodone) immediate release tablet 5 mg  oxygen (O2) therapy  acetaminophen (Tylenol) tablet 650 mg  acetaminophen (Tylenol) oral liquid 650 mg  acetaminophen (Tylenol) suppository 650 mg  potassium phosphate (monobasic) (K-Phos) tablet 500 mg      Relevant Results  Labs  Results from last 72 hours   Lab Units 10/16/23  0531 10/15/23  0536   WBC AUTO  "x10*3/uL 8.3 12.9*   HEMOGLOBIN g/dL 10.8* 11.8*   HEMATOCRIT % 33.5* 37.0   PLATELETS AUTO x10*3/uL 207 211       Results from last 72 hours   Lab Units 10/17/23  0508 10/16/23  0531 10/15/23  0536   SODIUM mmol/L 137 137 135*   POTASSIUM mmol/L 3.2* 3.3* 3.6   CHLORIDE mmol/L 98 104 103   CO2 mmol/L 31 28 27   BUN mg/dL 5* 7 9   CREATININE mg/dL 0.48* 0.53 0.54   GLUCOSE mg/dL 110* 110* 145*   CALCIUM mg/dL 8.9 8.1* 8.3*   ANION GAP mmol/L 11 8* 9*   EGFR mL/min/1.73m*2 >90 >90 >90   PHOSPHORUS mg/dL  --  1.7* 1.2*       Results from last 72 hours   Lab Units 10/16/23  0531 10/15/23  0536   ALBUMIN g/dL 2.7* 3.1*       Estimated Creatinine Clearance: 91.8 mL/min (A) (by C-G formula based on SCr of 0.48 mg/dL (L)).  No results found for: \"CRP\"  Microbiology  Reviewed  Imaging  reviewed   This patient currently has cardiac telemetry ordered; if you would like to modify or discontinue the telemetry order, click here to go to the orders activity to modify/discontinue the order.  Assessment/Plan   SIRS, BC is negative  Appendicitis with localized peritonitis sp appendectomy     Recommendations :  Continue Zosyn,, plan on oral Augmentin for 3 days with discharge  Discussed with the medical team     I spent  minutes in the professional and overall care of this patient.      Charlotte Black MD  "

## 2023-10-18 VITALS
HEART RATE: 65 BPM | SYSTOLIC BLOOD PRESSURE: 145 MMHG | WEIGHT: 143 LBS | RESPIRATION RATE: 17 BRPM | HEIGHT: 60 IN | DIASTOLIC BLOOD PRESSURE: 86 MMHG | BODY MASS INDEX: 28.07 KG/M2 | TEMPERATURE: 98.2 F | OXYGEN SATURATION: 93 %

## 2023-10-18 PROBLEM — K35.80 ACUTE APPENDICITIS: Status: RESOLVED | Noted: 2023-10-13 | Resolved: 2023-10-18

## 2023-10-18 PROBLEM — K35.33 ACUTE APPENDICITIS WITH PERFORATION, LOCALIZED PERITONITIS, AND ABSCESS, WITHOUT GANGRENE: Status: RESOLVED | Noted: 2023-10-13 | Resolved: 2023-10-18

## 2023-10-18 LAB
ALBUMIN SERPL BCP-MCNC: 3.1 G/DL (ref 3.4–5)
ALP SERPL-CCNC: 162 U/L (ref 33–136)
ALT SERPL W P-5'-P-CCNC: 70 U/L (ref 7–45)
ANION GAP SERPL CALC-SCNC: 11 MMOL/L (ref 10–20)
AST SERPL W P-5'-P-CCNC: 46 U/L (ref 9–39)
BACTERIA BLD CULT: NORMAL
BACTERIA BLD CULT: NORMAL
BILIRUB SERPL-MCNC: 0.4 MG/DL (ref 0–1.2)
BUN SERPL-MCNC: 11 MG/DL (ref 6–23)
CALCIUM SERPL-MCNC: 9.7 MG/DL (ref 8.6–10.3)
CHLORIDE SERPL-SCNC: 97 MMOL/L (ref 98–107)
CO2 SERPL-SCNC: 32 MMOL/L (ref 21–32)
CREAT SERPL-MCNC: 0.45 MG/DL (ref 0.5–1.05)
EJECTION FRACTION APICAL 4 CHAMBER: 63.3
ERYTHROCYTE [DISTWIDTH] IN BLOOD BY AUTOMATED COUNT: 13.4 % (ref 11.5–14.5)
GFR SERPL CREATININE-BSD FRML MDRD: >90 ML/MIN/1.73M*2
GLUCOSE SERPL-MCNC: 128 MG/DL (ref 74–99)
HCT VFR BLD AUTO: 37.4 % (ref 36–46)
HGB BLD-MCNC: 12.3 G/DL (ref 12–16)
MAGNESIUM SERPL-MCNC: 1.76 MG/DL (ref 1.6–2.4)
MCH RBC QN AUTO: 29.9 PG (ref 26–34)
MCHC RBC AUTO-ENTMCNC: 32.9 G/DL (ref 32–36)
MCV RBC AUTO: 91 FL (ref 80–100)
NRBC BLD-RTO: 0.3 /100 WBCS (ref 0–0)
PLATELET # BLD AUTO: 283 X10*3/UL (ref 150–450)
PMV BLD AUTO: 8.6 FL (ref 7.5–11.5)
POTASSIUM SERPL-SCNC: 3.6 MMOL/L (ref 3.5–5.3)
PROT SERPL-MCNC: 5.8 G/DL (ref 6.4–8.2)
RBC # BLD AUTO: 4.12 X10*6/UL (ref 4–5.2)
SODIUM SERPL-SCNC: 136 MMOL/L (ref 136–145)
WBC # BLD AUTO: 9.4 X10*3/UL (ref 4.4–11.3)

## 2023-10-18 PROCEDURE — 99232 SBSQ HOSP IP/OBS MODERATE 35: CPT | Performed by: INTERNAL MEDICINE

## 2023-10-18 PROCEDURE — 2500000004 HC RX 250 GENERAL PHARMACY W/ HCPCS (ALT 636 FOR OP/ED): Performed by: FAMILY MEDICINE

## 2023-10-18 PROCEDURE — 2500000001 HC RX 250 WO HCPCS SELF ADMINISTERED DRUGS (ALT 637 FOR MEDICARE OP): Performed by: FAMILY MEDICINE

## 2023-10-18 PROCEDURE — 85027 COMPLETE CBC AUTOMATED: CPT | Performed by: NURSE PRACTITIONER

## 2023-10-18 PROCEDURE — 36415 COLL VENOUS BLD VENIPUNCTURE: CPT | Performed by: NURSE PRACTITIONER

## 2023-10-18 PROCEDURE — 2500000001 HC RX 250 WO HCPCS SELF ADMINISTERED DRUGS (ALT 637 FOR MEDICARE OP): Performed by: SURGERY

## 2023-10-18 PROCEDURE — 80053 COMPREHEN METABOLIC PANEL: CPT | Performed by: NURSE PRACTITIONER

## 2023-10-18 PROCEDURE — 83735 ASSAY OF MAGNESIUM: CPT | Performed by: NURSE PRACTITIONER

## 2023-10-18 PROCEDURE — 2500000004 HC RX 250 GENERAL PHARMACY W/ HCPCS (ALT 636 FOR OP/ED): Performed by: SURGERY

## 2023-10-18 PROCEDURE — 99239 HOSP IP/OBS DSCHRG MGMT >30: CPT | Performed by: NURSE PRACTITIONER

## 2023-10-18 RX ORDER — AMOXICILLIN AND CLAVULANATE POTASSIUM 875; 125 MG/1; MG/1
875 TABLET, FILM COATED ORAL EVERY 12 HOURS SCHEDULED
Qty: 6 TABLET | Refills: 0 | Status: SHIPPED | OUTPATIENT
Start: 2023-10-18 | End: 2023-10-21

## 2023-10-18 RX ORDER — TRAMADOL HYDROCHLORIDE 50 MG/1
50 TABLET ORAL EVERY 6 HOURS PRN
Qty: 10 TABLET | Refills: 0 | Status: SHIPPED | OUTPATIENT
Start: 2023-10-18 | End: 2023-10-21

## 2023-10-18 RX ORDER — ACETAMINOPHEN 325 MG/1
650 TABLET ORAL EVERY 6 HOURS PRN
Start: 2023-10-18 | End: 2023-11-17

## 2023-10-18 RX ADMIN — FUROSEMIDE 40 MG: 10 INJECTION, SOLUTION INTRAVENOUS at 14:49

## 2023-10-18 RX ADMIN — ACETAMINOPHEN 650 MG: 325 TABLET ORAL at 14:41

## 2023-10-18 RX ADMIN — ACETAMINOPHEN 650 MG: 325 TABLET ORAL at 09:44

## 2023-10-18 RX ADMIN — ACETAMINOPHEN 650 MG: 325 TABLET ORAL at 06:14

## 2023-10-18 RX ADMIN — IBUPROFEN 600 MG: 600 TABLET ORAL at 06:14

## 2023-10-18 RX ADMIN — AMOXICILLIN AND CLAVULANATE POTASSIUM 875 MG: 875; 125 TABLET, FILM COATED ORAL at 09:44

## 2023-10-18 RX ADMIN — ATENOLOL 100 MG: 50 TABLET ORAL at 09:45

## 2023-10-18 RX ADMIN — OXYCODONE HYDROCHLORIDE 5 MG: 5 TABLET ORAL at 06:27

## 2023-10-18 RX ADMIN — CHLORTHALIDONE 25 MG: 25 TABLET ORAL at 09:45

## 2023-10-18 RX ADMIN — ACETAMINOPHEN 650 MG: 325 TABLET ORAL at 18:27

## 2023-10-18 RX ADMIN — ACETAMINOPHEN 650 MG: 325 TABLET ORAL at 02:50

## 2023-10-18 RX ADMIN — ONDANSETRON 4 MG: 2 INJECTION INTRAMUSCULAR; INTRAVENOUS at 06:15

## 2023-10-18 RX ADMIN — IBUPROFEN 600 MG: 600 TABLET ORAL at 18:26

## 2023-10-18 RX ADMIN — IBUPROFEN 600 MG: 600 TABLET ORAL at 12:23

## 2023-10-18 ASSESSMENT — ACTIVITIES OF DAILY LIVING (ADL): LACK_OF_TRANSPORTATION: NO

## 2023-10-18 ASSESSMENT — PAIN SCALES - GENERAL
PAINLEVEL_OUTOF10: 4
PAINLEVEL_OUTOF10: 3

## 2023-10-18 ASSESSMENT — PAIN - FUNCTIONAL ASSESSMENT: PAIN_FUNCTIONAL_ASSESSMENT: 0-10

## 2023-10-18 NOTE — PROGRESS NOTES
10/18/23 1031   Discharge Planning   Living Arrangements Spouse/significant other   Support Systems Spouse/significant other   Assistance Needed Patient is A&O X3, independent with ADL's, uses no DME. Patient does not drive- spouse transports patient.   Type of Residence Private residence   Number of Stairs to Enter Residence 2   Number of Stairs Within Residence 0   Do you have animals or pets at home? Yes   Type of Animals or Pets Cat   Who is requesting discharge planning? Provider   Home or Post Acute Services None   Patient expects to be discharged to: Home with spouse   Does the patient need discharge transport arranged? No   Financial Resource Strain   How hard is it for you to pay for the very basics like food, housing, medical care, and heating? Not hard   Housing Stability   In the last 12 months, was there a time when you were not able to pay the mortgage or rent on time? N   In the last 12 months, how many places have you lived? 1   In the last 12 months, was there a time when you did not have a steady place to sleep or slept in a shelter (including now)? N   Transportation Needs   In the past 12 months, has lack of transportation kept you from medical appointments or from getting medications? no   In the past 12 months, has lack of transportation kept you from meetings, work, or from getting things needed for daily living? No

## 2023-10-18 NOTE — CARE PLAN
The patient's goals for the shift include  pain management/sleep    Problem: Pain  Goal: Takes deep breaths with improved pain control throughout the shift  Outcome: Progressing  Goal: Turns in bed with improved pain control throughout the shift  Outcome: Progressing  Goal: Walks with improved pain control throughout the shift  Outcome: Progressing  Goal: Performs ADL's with improved pain control throughout shift  Outcome: Progressing  Goal: Participates in PT with improved pain control throughout the shift  Outcome: Progressing  Goal: Free from opioid side effects throughout the shift  Outcome: Progressing  Goal: Free from acute confusion related to pain meds throughout the shift  Outcome: Progressing     Problem: Pain - Adult  Goal: Verbalizes/displays adequate comfort level or baseline comfort level  Outcome: Progressing     Problem: Safety - Adult  Goal: Free from fall injury  Outcome: Progressing     Problem: Discharge Planning  Goal: Discharge to home or other facility with appropriate resources  Outcome: Progressing     Problem: Chronic Conditions and Co-morbidities  Goal: Patient's chronic conditions and co-morbidity symptoms are monitored and maintained or improved  Outcome: Progressing       The clinical goals for the shift include patient will have pain managed to be able to sleep at least 5 hours.

## 2023-10-18 NOTE — PROGRESS NOTES
Mari Deluca is a 68 y.o. female on day 4 of admission presenting with Acute appendicitis with perforation, localized peritonitis, and abscess, without gangrene.    Subjective   Interval History: no fever, less abdominal pain, no nausea        Review of Systems    Objective   Range of Vitals (last 24 hours)  Heart Rate:  [67-87]   Temp:  [35.9 °C (96.6 °F)-36.7 °C (98.1 °F)]   Resp:  [16-17]   BP: (138-153)/(75-84)   SpO2:  [91 %-95 %]   Daily Weight  10/15/23 : 64.9 kg (143 lb)    Body mass index is 27.93 kg/m².    Physical Exam  Constitutional:       Appearance: Normal appearance.   HENT:      Head: Normocephalic and atraumatic.      Mouth/Throat:      Mouth: Mucous membranes are moist.      Pharynx: Oropharynx is clear.   Eyes:      Pupils: Pupils are equal, round, and reactive to light.   Cardiovascular:      Rate and Rhythm: Normal rate and regular rhythm.      Heart sounds: Normal heart sounds.   Pulmonary:      Effort: Pulmonary effort is normal.      Breath sounds: Normal breath sounds.   Abdominal:      General: Abdomen is flat. Bowel sounds are normal.      Palpations: Abdomen is soft.      Comments: Mild distention, mild tenderness   Musculoskeletal:      Cervical back: Normal range of motion.   Neurological:      Mental Status: She is alert.           Antibiotics  atenoloL-chlorthalidone (Tenoretic) 100-25 mg per tablet 1 tablet  lovastatin (Mevacor) tablet 20 mg  magnesium sulfate in D5W IV 1 g  piperacillin-tazobactam-dextrose (Zosyn) IV 4.5 g  ketorolac (Toradol) injection 15 mg  pravastatin (Pravachol) tablet 20 mg  enoxaparin (Lovenox) syringe 40 mg  D5 % and 0.9 % sodium chloride infusion  acetaminophen (Tylenol) tablet 650 mg  ondansetron (Zofran) injection 4 mg  melatonin tablet 3 mg  HYDROmorphone (Dilaudid) injection 0.5 mg  sodium chloride 0.9 % bolus 500 mL  acetaminophen (Tylenol) tablet 650 mg  atenolol (Tenormin) tablet 100 mg  potassium chloride 20 mEq in 100 mL IV  premix  HYDROmorphone (Dilaudid) injection 0.2 mg  chlorthalidone (Hygroton) tablet 25 mg  chlorthalidone (Hygroton) tablet 25 mg  potassium chloride 20 mEq in 100 mL IV premix  potassium chloride CR (Klor-Con M20) ER tablet 40 mEq  lidocaine (cardiac) (Xylocaine) 100 mg/5 mL (2 %) injection  - Omnicell Override Pull  dexAMETHasone (Decadron) 4 mg/mL injection  - Omnicell Override Pull  ondansetron (Zofran) 4 mg/2 mL injection  - Omnicell Override Pull  rocuronium (ZeMuron) 10 mg/mL injection  - Omnicell Override Pull  sugammadex (Bridion) 100 mg/mL injection  - Omnicell Override Pull  fentaNYL PF (Sublimaze) 50 mcg/mL injection  - Omnicell Override Pull  midazolam (Versed) 1 mg/mL injection  - Omnicell Override Pull  propofol (Diprivan) 10 mg/mL injection  - Omnicell Override Pull  lactated Ringer's infusion  HYDROmorphone (Dilaudid) injection 0.25 mg  HYDROmorphone (Dilaudid) injection 0.5 mg  ondansetron (Zofran) injection 4 mg  droperidol (Inapsine) injection 0.625 mg  magnesium sulfate IV 2 g  piperacillin-tazobactam-dextrose (Zosyn) IV 3.375 g  rocuronium (ZeMuron) 10 mg/mL injection  - Omnicell Override Pull  dexAMETHasone (Decadron) 4 mg/mL injection  - Omnicell Override Pull  ondansetron (Zofran) 4 mg/2 mL injection  - Omnicell Override Pull  lidocaine (cardiac) (Xylocaine) 100 mg/5 mL (2 %) injection  - Omnicell Override Pull  sugammadex (Bridion) 100 mg/mL injection  - Omnicell Override Pull  bupivacaine PF (Marcaine) 0.5 % (5 mg/mL) injection  D5 % and 0.9 % sodium chloride infusion  ibuprofen tablet 600 mg  traMADol (Ultram) tablet 50 mg  oxyCODONE (Roxicodone) immediate release tablet 5 mg  oxygen (O2) therapy  acetaminophen (Tylenol) tablet 650 mg  acetaminophen (Tylenol) oral liquid 650 mg  acetaminophen (Tylenol) suppository 650 mg  potassium phosphate (monobasic) (K-Phos) tablet 500 mg      Relevant Results  Labs  Results from last 72 hours   Lab Units 10/18/23  0507 10/16/23  0531   WBC AUTO  "x10*3/uL 9.4 8.3   HEMOGLOBIN g/dL 12.3 10.8*   HEMATOCRIT % 37.4 33.5*   PLATELETS AUTO x10*3/uL 283 207       Results from last 72 hours   Lab Units 10/18/23  0507 10/17/23  0508 10/16/23  0531   SODIUM mmol/L 136 137 137   POTASSIUM mmol/L 3.6 3.2* 3.3*   CHLORIDE mmol/L 97* 98 104   CO2 mmol/L 32 31 28   BUN mg/dL 11 5* 7   CREATININE mg/dL 0.45* 0.48* 0.53   GLUCOSE mg/dL 128* 110* 110*   CALCIUM mg/dL 9.7 8.9 8.1*   ANION GAP mmol/L 11 11 8*   EGFR mL/min/1.73m*2 >90 >90 >90   PHOSPHORUS mg/dL  --   --  1.7*       Results from last 72 hours   Lab Units 10/18/23  0507 10/16/23  0531   ALK PHOS U/L 162*  --    BILIRUBIN TOTAL mg/dL 0.4  --    PROTEIN TOTAL g/dL 5.8*  --    ALT U/L 70*  --    AST U/L 46*  --    ALBUMIN g/dL 3.1* 2.7*       Estimated Creatinine Clearance: 97.9 mL/min (A) (by C-G formula based on SCr of 0.45 mg/dL (L)).  No results found for: \"CRP\"  Microbiology  Reviewed  Imaging  reviewed   This patient currently has cardiac telemetry ordered; if you would like to modify or discontinue the telemetry order, click here to go to the orders activity to modify/discontinue the order.  Assessment/Plan   SIRS, BC is negative  Appendicitis with localized peritonitis sp appendectomy     Recommendations :  Continue Zosyn,, plan on oral Augmentin for 3 days with discharge  Discussed with the medical team     I spent  minutes in the professional and overall care of this patient.      Charlotte Black MD  "

## 2023-10-18 NOTE — DISCHARGE SUMMARY
Discharge Diagnosis  Acute appendicitis with perforation, localized peritonitis, and abscess, without gangrene    Issues Requiring Follow-Up  Surgical Follow up    Discharge Meds     Your medication list        ASK your doctor about these medications        Instructions Last Dose Given Next Dose Due   ascorbic acid 500 mg tablet  Commonly known as: Vitamin C           atenoloL-chlorthalidone 100-25 mg tablet  Commonly known as: Tenoretic      Take 1 tablet by mouth once daily.       cholecalciferol 50 MCG (2000 UT) tablet  Commonly known as: Vitamin D-3           HYDROmorphone 2 mg tablet  Commonly known as: Dilaudid           ibuprofen 800 mg tablet           lovastatin 20 mg tablet  Commonly known as: Mevacor           lovastatin 40 mg tablet  Commonly known as: Mevacor      Take 1 tablet (40 mg) by mouth once daily at bedtime.       multivitamin tablet           NON FORMULARY           omega-3 1,000 mg capsule capsule           potassium chloride CR 10 mEq ER tablet  Commonly known as: Klor-Con M10      Take 1 tablet (10 mEq) by mouth once daily.       Sutab 1.479-0.188- 0.225 gram tablet  Generic drug: sod sulf-pot chloride-mag sulf                    Test Results Pending At Discharge  Pending Labs       Order Current Status    Surgical Pathology Exam In process    Blood Culture Preliminary result    Blood Culture Preliminary result          Results for orders placed or performed during the hospital encounter of 10/13/23 (from the past 24 hour(s))   CBC   Result Value Ref Range    WBC 9.4 4.4 - 11.3 x10*3/uL    nRBC 0.3 (H) 0.0 - 0.0 /100 WBCs    RBC 4.12 4.00 - 5.20 x10*6/uL    Hemoglobin 12.3 12.0 - 16.0 g/dL    Hematocrit 37.4 36.0 - 46.0 %    MCV 91 80 - 100 fL    MCH 29.9 26.0 - 34.0 pg    MCHC 32.9 32.0 - 36.0 g/dL    RDW 13.4 11.5 - 14.5 %    Platelets 283 150 - 450 x10*3/uL    MPV 8.6 7.5 - 11.5 fL   Comprehensive metabolic panel   Result Value Ref Range    Glucose 128 (H) 74 - 99 mg/dL    Sodium 136 136  - 145 mmol/L    Potassium 3.6 3.5 - 5.3 mmol/L    Chloride 97 (L) 98 - 107 mmol/L    Bicarbonate 32 21 - 32 mmol/L    Anion Gap 11 10 - 20 mmol/L    Urea Nitrogen 11 6 - 23 mg/dL    Creatinine 0.45 (L) 0.50 - 1.05 mg/dL    eGFR >90 >60 mL/min/1.73m*2    Calcium 9.7 8.6 - 10.3 mg/dL    Albumin 3.1 (L) 3.4 - 5.0 g/dL    Alkaline Phosphatase 162 (H) 33 - 136 U/L    Total Protein 5.8 (L) 6.4 - 8.2 g/dL    AST 46 (H) 9 - 39 U/L    Bilirubin, Total 0.4 0.0 - 1.2 mg/dL    ALT 70 (H) 7 - 45 U/L   Magnesium   Result Value Ref Range    Magnesium 1.76 1.60 - 2.40 mg/dL       Hospital Course   67 y/o female with a medical history of HTN, Hyperlipidemia who presented to [facility] with RLQ pain and found to have acute appendicitis with perforation. Underwent appendectomy by Dr. Cabral on 10/14/23. ID and surgery consulted. Postop care per surgery. Drain removed. Antibiotics changed to oral Augmentin. ID recommended 3 more days of therapy. Diet advanced as tolerated. Medicated for pain PRN. Activity restrictions per surgery. Encouraged IS use, mobilization. Maintained bowel regimen. Preliminary blood cultures negative. Afebrile. No leukocytosis. Postoperatively has some acute hypoxic respiratory failure. Likely secondary to pulmonary edema/pleural effusion. CXR demonstrated small to moderate bilateral pleural effusions and interstitial thickening. Echocardiogram obtained. Diuresed with Lasix. Cardiology consulted. Clinically improved. Weaned off oxygen without difficulty. Cleared for discharge by cardiology and advised to resume home regimen and no need to continue with Lasix. Hemodynamically stable for discharge to home.     Medical Management:     Hypertension  -Medical therapy: Atenolol, Chlorthalidone     Hyperlipidemia  -On statin therapy     Acute Anemia  -Likely related to surgery, infection, and possible hemodilution.  -Hgb stable at 12.3.      DVT Prophylaxis  -Lovenox     Fluids/Electrolytes/Nutrition  -Laboratory data  reviewed.   -Electrolytes stable. Replaced as needed. .   -No nutritional concerns at this time.      Disposition  Plan of care discussed with attending, Dr. Toure and cardiology. Seen and examined in her room this AM. Awake and alert. Doing well. Abdominal pain controlled. She denies chest pain, breathing difficulties, N/V/D/C, fever, or chills.  Medications reviewed and reconciled. Scripts prepared as needed. Greater than 35 minutes spent in coordination of care, including physical examination, chart review, medication reconciliation, collaboration with providers, staff, and family.     Pertinent Physical Exam At Time of Discharge  Physical Exam  Constitutional: A&O x 3; NAD; calm and cooperative  Eyes: EOM's intact  HEENT: Normocephalic, Atraumatic. Oral mucosa moist.   Neck: Supple. No JVD, lymphadenopathy.   Lungs: CTAB with fair air movement. Respirations even and unlabored on room air.   Heart: RRR  Abdomen: Soft, non-tender, non-distended, +BS. Lap sites are well-approximated REZA. Drain site covered with DSD.   MS/Extremities: ROMANO equally x 4. No edema. Peripheral pulses intact bilaterally.   Neuro: A&O x3; no focal deficits; gross motor and sensation intact.   Skin: Warm and dry. No rashes or lesions  Psych: Normal affect.     Outpatient Follow-Up  Future Appointments   Date Time Provider Department Center   12/18/2023  9:30 AM Anders Pappas DO [id number] [facility]   11/7/2024  9:30 AM Samara Chaidez MD [id number] [facility]         OMARI Wright-CNP

## 2023-10-18 NOTE — PROGRESS NOTES
Mari Deluca is a 68 y.o. female on day 4 of admission presenting with Acute appendicitis with perforation, localized peritonitis, and abscess, without gangrene.    Subjective   No acute events overnight. Patient is doing well this morning. She reported having some nausea upon awakening but has since resolved after eating breakfast. She otherwise denies any SOB, CP, cough, f/c, headaches, or dizziness.    12 point ROS otherwise negative       Objective     Physical Exam  Constitutional:       Appearance: Normal appearance.   HENT:      Head: Normocephalic and atraumatic.   Eyes:      Conjunctiva/sclera: Conjunctivae normal.   Cardiovascular:      Rate and Rhythm: Normal rate and regular rhythm.      Pulses: Normal pulses.      Heart sounds: Normal heart sounds.   Pulmonary:      Effort: Pulmonary effort is normal.      Breath sounds: Normal breath sounds.   Musculoskeletal:         General: Normal range of motion.      Cervical back: Normal range of motion.   Skin:     General: Skin is warm and dry.   Neurological:      General: No focal deficit present.      Mental Status: She is alert and oriented to person, place, and time.   Psychiatric:         Mood and Affect: Mood normal.         Behavior: Behavior normal.         Last Recorded Vitals  Blood pressure 142/84, pulse 87, temperature 36.7 °C (98.1 °F), resp. rate 16, height 1.524 m (5'), weight 64.9 kg (143 lb), SpO2 93 %.  Intake/Output last 3 Shifts:  I/O last 3 completed shifts:  In: 1120 (17.3 mL/kg) [P.O.:1120]  Out: 6853 (105.7 mL/kg) [Urine:6853 (2.9 mL/kg/hr)]  Weight: 64.9 kg     Relevant Results  Results for orders placed or performed during the hospital encounter of 10/13/23 (from the past 24 hour(s))   CBC   Result Value Ref Range    WBC 9.4 4.4 - 11.3 x10*3/uL    nRBC 0.3 (H) 0.0 - 0.0 /100 WBCs    RBC 4.12 4.00 - 5.20 x10*6/uL    Hemoglobin 12.3 12.0 - 16.0 g/dL    Hematocrit 37.4 36.0 - 46.0 %    MCV 91 80 - 100 fL    MCH 29.9 26.0 - 34.0 pg     MCHC 32.9 32.0 - 36.0 g/dL    RDW 13.4 11.5 - 14.5 %    Platelets 283 150 - 450 x10*3/uL    MPV 8.6 7.5 - 11.5 fL   Comprehensive metabolic panel   Result Value Ref Range    Glucose 128 (H) 74 - 99 mg/dL    Sodium 136 136 - 145 mmol/L    Potassium 3.6 3.5 - 5.3 mmol/L    Chloride 97 (L) 98 - 107 mmol/L    Bicarbonate 32 21 - 32 mmol/L    Anion Gap 11 10 - 20 mmol/L    Urea Nitrogen 11 6 - 23 mg/dL    Creatinine 0.45 (L) 0.50 - 1.05 mg/dL    eGFR >90 >60 mL/min/1.73m*2    Calcium 9.7 8.6 - 10.3 mg/dL    Albumin 3.1 (L) 3.4 - 5.0 g/dL    Alkaline Phosphatase 162 (H) 33 - 136 U/L    Total Protein 5.8 (L) 6.4 - 8.2 g/dL    AST 46 (H) 9 - 39 U/L    Bilirubin, Total 0.4 0.0 - 1.2 mg/dL    ALT 70 (H) 7 - 45 U/L   Magnesium   Result Value Ref Range    Magnesium 1.76 1.60 - 2.40 mg/dL                     Assessment/Plan     # Hypervolemia in post-operative setting  -  with evidence of volume overload on CXR.  - Echo on 10/17 showed normal EF but elevated RVSP.   - no previous cardiac history other than HTN  - Can discontinue lasix given significantly improved symptoms and patient appearing euvolemic today with good response of 4.8L output over 24hrs.   - Stable for discharge from cardiac standpoint.   - Can continue on home dose of chlorthalidone 25mg daily   - Follow up with PCP     #HTN  - stable  - continue on atenolol 100 mg daily  - continue on chlorthalidone 25 mg daily        Angy Echavarria DO  PGY-1     Patient seen, discussed and examined with Dr. Echavarria, above is representative of my medical decision making.    Franko Guillory MD

## 2023-10-19 ENCOUNTER — PATIENT OUTREACH (OUTPATIENT)
Dept: PRIMARY CARE | Facility: CLINIC | Age: 68
End: 2023-10-19
Payer: MEDICARE

## 2023-10-19 DIAGNOSIS — K35.211 ACUTE APPENDICITIS WITH PERFORATION, GENERALIZED PERITONITIS, AND ABSCESS, WITHOUT GANGRENE: ICD-10-CM

## 2023-10-19 DIAGNOSIS — I10 PRIMARY HYPERTENSION: ICD-10-CM

## 2023-10-19 NOTE — PROGRESS NOTES
Discharge Facility: [facility]  Discharge Diagnosis: Acute appendicitis with perforation and abscess  Admission Date: 10/13/23  Discharge Date: 10/18/23    PCP Appointment Date: 11/1/23  Specialist Appointment Date: Surgeon- TBD  Hospital Encounter and Summary: Linked   See discharge assessment below for further details      Engagement  Call Start Time: 1347 (10/19/2023  6:24 PM)    Medications  Medications reviewed with patient/caregiver?: Yes (10/19/2023  6:24 PM)  Is the patient having any side effects they believe may be caused by any medication additions or changes?: No (10/19/2023  6:24 PM)  Does the patient have all medications ordered at discharge?: Yes (10/19/2023  6:24 PM)  Care Management Interventions: No intervention needed (10/19/2023  6:24 PM)  Prescription Comments: Start Augmentin and Tramadol. Stop Sutab (10/19/2023  6:24 PM)  Is the patient taking all medications as directed (includes completed medication regime)?: Yes (10/19/2023  6:24 PM)  Care Management Interventions: Provided patient education (10/19/2023  6:24 PM)  Medication Comments: See medication list (10/19/2023  6:24 PM)    Appointments  Does the patient have a primary care provider?: Yes (10/19/2023  6:24 PM)  Care Management Interventions: Educated patient on importance of making appointment; Advised patient to make appointment (10/19/2023  6:24 PM)  Has the patient kept scheduled appointments due by today?: Not applicable (10/19/2023  6:24 PM)  Care Management Interventions: Advised patient to keep appointment (10/19/2023  6:24 PM)  Follow Up Tasks: PCP needed (TCM scheduled today) (10/19/2023  6:24 PM)    Self Management  What is the home health agency?: n/a (10/19/2023  6:24 PM)  Has home health visited the patient within 72 hours of discharge?: Not applicable (10/19/2023  6:24 PM)  What Durable Medical Equipment (DME) was ordered?: n/a (10/19/2023  6:24 PM)    Patient Teaching  Does the patient have access to their discharge  instructions?: Yes (10/19/2023  6:24 PM)  Care Management Interventions: Reviewed instructions with patient (10/19/2023  6:24 PM)  What is the patient's perception of their health status since discharge?: Improving (10/19/2023  6:24 PM)  Is the patient/caregiver able to teach back the hierarchy of who to call/visit for symptoms/problems? PCP, Specialist, Home Health nurse, Urgent Care, ED, 911: Yes (10/19/2023  6:24 PM)    Wrap Up  Wrap Up Additional Comments: Called the patient for initial outreach post discharge from the hospital. Patient states she is home and recovering well. Patient denied any new or worsening symptoms at this time. Patient denied the need for DME, HHC or assistance obtaining transportation, stating she has help in the home from family if needed. I assisted the patient with scheduling her PCP follow up today during TCM call. Patient still needs to schedule a follow up with her surgeon and plans to do so today. I confirmed patient had the phone number to call and schedule. Patient confirmed she had her discharge summary and all medications needed in the home, including new Augmentin and Tramadol. Patient states she is tolerating the new medications well thus far. Educated on adequate hydration and protein intake to promote healing. TCM phone number was provided to the patient with the patient encouraged to call with any needs or questions that may arise to help prevent another hospitalization. Patient verbalized her understanding and stated she had no further questions or concerns at this time, but would call back if needed. (10/19/2023  6:24 PM)  Call End Time: 1400 (10/19/2023  6:24 PM)

## 2023-10-20 RX ORDER — ATENOLOL AND CHLORTHALIDONE TABLET 100; 25 MG/1; MG/1
1 TABLET ORAL DAILY
Qty: 100 TABLET | Refills: 2 | Status: SHIPPED | OUTPATIENT
Start: 2023-10-20

## 2023-10-20 NOTE — SIGNIFICANT EVENT
States her dressing is dry and intact, denies fever or chills. Feeling better, no further questions.

## 2023-10-24 LAB
LABORATORY COMMENT REPORT: NORMAL
PATH REPORT.FINAL DX SPEC: NORMAL
PATH REPORT.GROSS SPEC: NORMAL
PATH REPORT.RELEVANT HX SPEC: NORMAL
PATH REPORT.TOTAL CANCER: NORMAL

## 2023-11-01 ENCOUNTER — OFFICE VISIT (OUTPATIENT)
Dept: PRIMARY CARE | Facility: CLINIC | Age: 68
End: 2023-11-01
Payer: MEDICARE

## 2023-11-01 VITALS
DIASTOLIC BLOOD PRESSURE: 76 MMHG | BODY MASS INDEX: 28.47 KG/M2 | OXYGEN SATURATION: 99 % | WEIGHT: 145 LBS | HEIGHT: 60 IN | HEART RATE: 76 BPM | SYSTOLIC BLOOD PRESSURE: 128 MMHG

## 2023-11-01 DIAGNOSIS — R74.01 ELEVATED TRANSAMINASE LEVEL: ICD-10-CM

## 2023-11-01 DIAGNOSIS — K35.33 ACUTE APPENDICITIS WITH PERFORATION, LOCALIZED PERITONITIS, AND ABSCESS, WITHOUT GANGRENE: Primary | ICD-10-CM

## 2023-11-01 PROBLEM — Z96.652 PRESENCE OF LEFT ARTIFICIAL KNEE JOINT: Status: ACTIVE | Noted: 2023-07-27

## 2023-11-01 LAB
ALBUMIN SERPL BCP-MCNC: 4.6 G/DL (ref 3.4–5)
ALP SERPL-CCNC: 115 U/L (ref 33–136)
ALT SERPL W P-5'-P-CCNC: 37 U/L (ref 7–45)
ANION GAP SERPL CALC-SCNC: 12 MMOL/L (ref 10–20)
AST SERPL W P-5'-P-CCNC: 25 U/L (ref 9–39)
BASOPHILS # BLD AUTO: 0.05 X10*3/UL (ref 0–0.1)
BASOPHILS NFR BLD AUTO: 0.7 %
BILIRUB SERPL-MCNC: 0.6 MG/DL (ref 0–1.2)
BUN SERPL-MCNC: 19 MG/DL (ref 6–23)
CALCIUM SERPL-MCNC: 11.2 MG/DL (ref 8.6–10.3)
CHLORIDE SERPL-SCNC: 101 MMOL/L (ref 98–107)
CO2 SERPL-SCNC: 30 MMOL/L (ref 21–32)
CREAT SERPL-MCNC: 0.54 MG/DL (ref 0.5–1.05)
EOSINOPHIL # BLD AUTO: 0.09 X10*3/UL (ref 0–0.7)
EOSINOPHIL NFR BLD AUTO: 1.2 %
ERYTHROCYTE [DISTWIDTH] IN BLOOD BY AUTOMATED COUNT: 13.3 % (ref 11.5–14.5)
GFR SERPL CREATININE-BSD FRML MDRD: >90 ML/MIN/1.73M*2
GLUCOSE SERPL-MCNC: 116 MG/DL (ref 74–99)
HCT VFR BLD AUTO: 42.1 % (ref 36–46)
HGB BLD-MCNC: 13.8 G/DL (ref 12–16)
IMM GRANULOCYTES # BLD AUTO: 0.02 X10*3/UL (ref 0–0.7)
IMM GRANULOCYTES NFR BLD AUTO: 0.3 % (ref 0–0.9)
LYMPHOCYTES # BLD AUTO: 2.88 X10*3/UL (ref 1.2–4.8)
LYMPHOCYTES NFR BLD AUTO: 38.1 %
MCH RBC QN AUTO: 29.7 PG (ref 26–34)
MCHC RBC AUTO-ENTMCNC: 32.8 G/DL (ref 32–36)
MCV RBC AUTO: 91 FL (ref 80–100)
MONOCYTES # BLD AUTO: 0.7 X10*3/UL (ref 0.1–1)
MONOCYTES NFR BLD AUTO: 9.3 %
NEUTROPHILS # BLD AUTO: 3.81 X10*3/UL (ref 1.2–7.7)
NEUTROPHILS NFR BLD AUTO: 50.4 %
NRBC BLD-RTO: 0 /100 WBCS (ref 0–0)
PLATELET # BLD AUTO: 397 X10*3/UL (ref 150–450)
POTASSIUM SERPL-SCNC: 4.4 MMOL/L (ref 3.5–5.3)
PROT SERPL-MCNC: 7.2 G/DL (ref 6.4–8.2)
RBC # BLD AUTO: 4.64 X10*6/UL (ref 4–5.2)
SODIUM SERPL-SCNC: 139 MMOL/L (ref 136–145)
WBC # BLD AUTO: 7.6 X10*3/UL (ref 4.4–11.3)

## 2023-11-01 PROCEDURE — 90662 IIV NO PRSV INCREASED AG IM: CPT | Performed by: FAMILY MEDICINE

## 2023-11-01 PROCEDURE — 80053 COMPREHEN METABOLIC PANEL: CPT

## 2023-11-01 PROCEDURE — 1160F RVW MEDS BY RX/DR IN RCRD: CPT | Performed by: FAMILY MEDICINE

## 2023-11-01 PROCEDURE — 1036F TOBACCO NON-USER: CPT | Performed by: FAMILY MEDICINE

## 2023-11-01 PROCEDURE — 3078F DIAST BP <80 MM HG: CPT | Performed by: FAMILY MEDICINE

## 2023-11-01 PROCEDURE — 85025 COMPLETE CBC W/AUTO DIFF WBC: CPT

## 2023-11-01 PROCEDURE — 1159F MED LIST DOCD IN RCRD: CPT | Performed by: FAMILY MEDICINE

## 2023-11-01 PROCEDURE — 1125F AMNT PAIN NOTED PAIN PRSNT: CPT | Performed by: FAMILY MEDICINE

## 2023-11-01 PROCEDURE — 1111F DSCHRG MED/CURRENT MED MERGE: CPT | Performed by: FAMILY MEDICINE

## 2023-11-01 PROCEDURE — 3074F SYST BP LT 130 MM HG: CPT | Performed by: FAMILY MEDICINE

## 2023-11-01 PROCEDURE — G0008 ADMIN INFLUENZA VIRUS VAC: HCPCS | Performed by: FAMILY MEDICINE

## 2023-11-01 PROCEDURE — 36415 COLL VENOUS BLD VENIPUNCTURE: CPT

## 2023-11-01 PROCEDURE — 99495 TRANSJ CARE MGMT MOD F2F 14D: CPT | Performed by: FAMILY MEDICINE

## 2023-11-01 RX ORDER — CHOLECALCIFEROL (VITAMIN D3) 25 MCG
2500 TABLET,CHEWABLE ORAL DAILY
COMMUNITY

## 2023-11-01 NOTE — PROGRESS NOTES
"Patient: Mari Deluca  : 1955  PCP: Anders Pappas DO  MRN: [id number]  Program: No linked episodes     Mari Deluca is a 68 y.o. female presenting today for follow-up after being discharged from the hospital 14 days ago. The main problem requiring admission was Acute Appendicitis. The discharge summary and/or Transitional Care Management documentation was reviewed. Medication reconciliation was performed as indicated via the \"Ashu as Reviewed\" timestamp.     Mari Deluca was contacted by Transitional Care Management services two days after her discharge. This encounter and supporting documentation was reviewed.    The complexity of medical decision making for this patient's transitional care is moderate.    Was in the hospital for acute appendicitis with perforation  Was having lower abdominal pain  Then started with n/v and then spiked a fever  Removed appendix and placed a drain  Did 3 days of antibiotics after discharged    Getting better everyday  Still getting some pinches in RLQ  No fever, chills  No n/v, diarrhea, constipation, melena, hematochezia  Appetite is slowly coming back  No CP, SOB, palpitations, numbness, weakness, dizziness    Physical Exam  Vitals and nursing note reviewed.   Constitutional:       Appearance: Normal appearance.   HENT:      Head: Normocephalic and atraumatic.      Right Ear: Tympanic membrane, ear canal and external ear normal.      Left Ear: Tympanic membrane, ear canal and external ear normal.      Nose: Nose normal.      Mouth/Throat:      Mouth: Mucous membranes are moist.      Pharynx: Oropharynx is clear.   Eyes:      Extraocular Movements: Extraocular movements intact.      Conjunctiva/sclera: Conjunctivae normal.      Pupils: Pupils are equal, round, and reactive to light.   Cardiovascular:      Rate and Rhythm: Normal rate and regular rhythm.      Pulses: Normal pulses.      Heart sounds: Normal heart sounds.   Pulmonary:      Effort: Pulmonary " effort is normal.      Breath sounds: Normal breath sounds.   Abdominal:      General: Abdomen is flat. Bowel sounds are normal.      Palpations: Abdomen is soft.   Musculoskeletal:      Cervical back: Normal range of motion and neck supple.   Skin:     General: Skin is warm and dry.      Capillary Refill: Capillary refill takes less than 2 seconds.      Comments: Healing incision sites on abdomen- no discharge, erythema or increased warmth   Neurological:      General: No focal deficit present.      Mental Status: She is alert and oriented to person, place, and time.      Cranial Nerves: No cranial nerve deficit.      Sensory: No sensory deficit.      Motor: No weakness.      Deep Tendon Reflexes: Reflexes normal.   Psychiatric:         Mood and Affect: Mood normal.         Behavior: Behavior normal.         Assessment/Plan   Problem List Items Addressed This Visit    None  Visit Diagnoses         Codes    Acute appendicitis with perforation, localized peritonitis, and abscess, without gangrene    -  Primary K35.33    Relevant Orders    Comprehensive Metabolic Panel (Completed)    CBC and Auto Differential (Completed)    Elevated transaminase level     R74.01    Relevant Orders    Comprehensive Metabolic Panel (Completed)    CBC and Auto Differential (Completed)            Review of Systems    Family History   Problem Relation Name Age of Onset    Melanoma Father      Melanoma Daughter      Coronary artery disease Neg Hx         Engagement  Call Start Time: 1347 (10/19/2023  6:24 PM)    Medications  Medications reviewed with patient/caregiver?: Yes (10/19/2023  6:24 PM)  Is the patient having any side effects they believe may be caused by any medication additions or changes?: No (10/19/2023  6:24 PM)  Does the patient have all medications ordered at discharge?: Yes (10/19/2023  6:24 PM)  Care Management Interventions: No intervention needed (10/19/2023  6:24 PM)  Prescription Comments: Start Augmentin and Tramadol.  Stop Sutab (10/19/2023  6:24 PM)  Is the patient taking all medications as directed (includes completed medication regime)?: Yes (10/19/2023  6:24 PM)  Care Management Interventions: Provided patient education (10/19/2023  6:24 PM)  Medication Comments: See medication list (10/19/2023  6:24 PM)    Appointments  Does the patient have a primary care provider?: Yes (10/19/2023  6:24 PM)  Care Management Interventions: Educated patient on importance of making appointment; Advised patient to make appointment (10/19/2023  6:24 PM)  Has the patient kept scheduled appointments due by today?: Not applicable (10/19/2023  6:24 PM)  Care Management Interventions: Advised patient to keep appointment (10/19/2023  6:24 PM)  Follow Up Tasks: PCP needed (TCM scheduled today) (10/19/2023  6:24 PM)    Self Management  What is the home health agency?: n/a (10/19/2023  6:24 PM)  Has home health visited the patient within 72 hours of discharge?: Not applicable (10/19/2023  6:24 PM)  What Durable Medical Equipment (DME) was ordered?: n/a (10/19/2023  6:24 PM)    Patient Teaching  Does the patient have access to their discharge instructions?: Yes (10/19/2023  6:24 PM)  Care Management Interventions: Reviewed instructions with patient (10/19/2023  6:24 PM)  What is the patient's perception of their health status since discharge?: Improving (10/19/2023  6:24 PM)  Is the patient/caregiver able to teach back the hierarchy of who to call/visit for symptoms/problems? PCP, Specialist, Home Health nurse, Urgent Care, ED, 911: Yes (10/19/2023  6:24 PM)    Wrap Up  Wrap Up Additional Comments: Called the patient for initial outreach post discharge from the hospital. Patient states she is home and recovering well. Patient denied any new or worsening symptoms at this time. Patient denied the need for DME, HHC or assistance obtaining transportation, stating she has help in the home from family if needed. I assisted the patient with scheduling her PCP  follow up today during TCM call. Patient still needs to schedule a follow up with her surgeon and plans to do so today. I confirmed patient had the phone number to call and schedule. Patient confirmed she had her discharge summary and all medications needed in the home, including new Augmentin and Tramadol. Patient states she is tolerating the new medications well thus far. Educated on adequate hydration and protein intake to promote healing. TCM phone number was provided to the patient with the patient encouraged to call with any needs or questions that may arise to help prevent another hospitalization. Patient verbalized her understanding and stated she had no further questions or concerns at this time, but would call back if needed. (10/19/2023  6:24 PM)  Call End Time: 1400 (10/19/2023  6:24 PM)        No follow-ups on file.

## 2023-11-03 ENCOUNTER — OFFICE VISIT (OUTPATIENT)
Dept: SURGERY | Facility: CLINIC | Age: 68
End: 2023-11-03
Payer: MEDICARE

## 2023-11-03 VITALS — OXYGEN SATURATION: 96 % | SYSTOLIC BLOOD PRESSURE: 142 MMHG | DIASTOLIC BLOOD PRESSURE: 86 MMHG | HEART RATE: 69 BPM

## 2023-11-03 DIAGNOSIS — Z09 POSTOPERATIVE EXAMINATION: Primary | ICD-10-CM

## 2023-11-03 PROCEDURE — 1160F RVW MEDS BY RX/DR IN RCRD: CPT | Performed by: PHYSICIAN ASSISTANT

## 2023-11-03 PROCEDURE — 1125F AMNT PAIN NOTED PAIN PRSNT: CPT | Performed by: PHYSICIAN ASSISTANT

## 2023-11-03 PROCEDURE — 99024 POSTOP FOLLOW-UP VISIT: CPT | Performed by: PHYSICIAN ASSISTANT

## 2023-11-03 PROCEDURE — 1159F MED LIST DOCD IN RCRD: CPT | Performed by: PHYSICIAN ASSISTANT

## 2023-11-03 PROCEDURE — 3077F SYST BP >= 140 MM HG: CPT | Performed by: PHYSICIAN ASSISTANT

## 2023-11-03 PROCEDURE — 1036F TOBACCO NON-USER: CPT | Performed by: PHYSICIAN ASSISTANT

## 2023-11-03 PROCEDURE — 3079F DIAST BP 80-89 MM HG: CPT | Performed by: PHYSICIAN ASSISTANT

## 2023-11-03 PROCEDURE — 1111F DSCHRG MED/CURRENT MED MERGE: CPT | Performed by: PHYSICIAN ASSISTANT

## 2023-11-03 NOTE — PROGRESS NOTES
General Surgery Post Operative Follow Up     Subjective   Mari Deluca presents to the clinic 2 weeks following a laparoscopic appendectomy. Pain has been improving over the past few weeks. Eating and drinking without issue. Denies diarrhea or constipation. Ambulatory at home. Incisions healing well.     Objective   /86   Pulse 69   SpO2 96%     Physical Exam  Constitutional: No acute distress, sitting up in bed.  Neuro: Alert, oriented. Follows commands.   Eyes: EOMI. No scleral icterus. Conjunctiva pink.  ENT: MMM.   Heart: Regular rate.  Respiratory: No increased work of breathing or audible wheeze.  Abdomen: Soft, nondistended. Appropriately tender. Incisions clean, dry, intact. Drain site scabbed over.  MSK: Moves all extremities.  Vascular: Palpable pulses throughout, no pitting edema.  Skin: No rashes.   Psychological: Appropriate mood and behavior.     Results:  Patho: acute appendicitis     Assessment/Plan   This is a 68 y.o. year old female who presents for a post operative follow up 2 weeks following a laparoscopic appendectomy. Doing well post operatively.     Plan:  -- Lifting restrictions: 2 more weeks of no heavy lifting > 10 lbs  -- OK for swimming and tub soaks  -- Return to strenuous activity gradually and as tolerated in 2 weeks  -- Regular diet  -- Follow up as needed    Discussed with Dr. Cabral who is in agreement with plan.     Kailey Gonsales PA-C  Subjective   Patient ID: Mari Deluca is a 68 y.o. female who presents for Post-op (10- Laparoscopic appendectomy).  HPI    Review of Systems    Objective   Physical Exam    Assessment/Plan

## 2023-11-09 ENCOUNTER — PATIENT OUTREACH (OUTPATIENT)
Dept: PRIMARY CARE | Facility: CLINIC | Age: 68
End: 2023-11-09
Payer: MEDICARE

## 2023-11-09 DIAGNOSIS — K35.33 ACUTE APPENDICITIS WITH PERFORATION, LOCALIZED PERITONITIS, AND ABSCESS, WITHOUT GANGRENE: ICD-10-CM

## 2023-11-09 NOTE — PROGRESS NOTES
Patient returned TCM outreach call stating she is doing well 14 days post discharge and has followed up with all of her physicians as requested, but still feels tired often. Patient states the fatigue requires naps throughout the day. Patient was educated on adequate hydration, consuming protein with each meal, medication compliance, regular ambulation and keeping/attending all physician appointments as ways to promote strength and healing and to prevent another hospitalization that could result in her becoming more weak/fatigued. Patient verbalized her understanding and states she has been trying to drink plenty of water and stay active. Patient denied any other symptoms or concerns at this time. TCM phone number was provided again with the patient encouraged to call back with any further needs. TCM to follow up again 30 days post discharge.

## 2023-11-09 NOTE — PROGRESS NOTES
Unable to reach patient for call back after patient's follow up appointment with PCP.   ERYNM with call back number for patient to call if needed   If no voicemail available call attempts x 2 were made to contact the patient to assist with any questions or concerns patient may have.

## 2023-11-13 LAB
ATRIAL RATE: 103 BPM
P AXIS: 45 DEGREES
P OFFSET: 204 MS
P ONSET: 143 MS
PR INTERVAL: 142 MS
Q ONSET: 214 MS
QRS COUNT: 17 BEATS
QRS DURATION: 88 MS
QT INTERVAL: 378 MS
QTC CALCULATION(BAZETT): 495 MS
QTC FREDERICIA: 452 MS
R AXIS: -4 DEGREES
T AXIS: 0 DEGREES
T OFFSET: 403 MS
VENTRICULAR RATE: 103 BPM

## 2023-11-27 ENCOUNTER — PATIENT OUTREACH (OUTPATIENT)
Dept: PRIMARY CARE | Facility: CLINIC | Age: 68
End: 2023-11-27
Payer: MEDICARE

## 2023-11-27 DIAGNOSIS — K35.33 ACUTE APPENDICITIS WITH PERFORATION, LOCALIZED PERITONITIS, AND ABSCESS, WITHOUT GANGRENE: ICD-10-CM

## 2023-12-18 ENCOUNTER — OFFICE VISIT (OUTPATIENT)
Dept: PRIMARY CARE | Facility: CLINIC | Age: 68
End: 2023-12-18
Payer: MEDICARE

## 2023-12-18 VITALS
DIASTOLIC BLOOD PRESSURE: 80 MMHG | WEIGHT: 147 LBS | HEIGHT: 60 IN | BODY MASS INDEX: 28.86 KG/M2 | HEART RATE: 64 BPM | SYSTOLIC BLOOD PRESSURE: 132 MMHG | OXYGEN SATURATION: 97 %

## 2023-12-18 DIAGNOSIS — Z12.31 ENCOUNTER FOR SCREENING MAMMOGRAM FOR BREAST CANCER: ICD-10-CM

## 2023-12-18 DIAGNOSIS — I10 PRIMARY HYPERTENSION: Primary | ICD-10-CM

## 2023-12-18 DIAGNOSIS — E21.0 HYPERPARATHYROIDISM, PRIMARY (MULTI): ICD-10-CM

## 2023-12-18 DIAGNOSIS — Z78.0 MENOPAUSE: ICD-10-CM

## 2023-12-18 DIAGNOSIS — Z13.89 ENCOUNTER FOR SCREENING FOR OTHER DISORDER: ICD-10-CM

## 2023-12-18 DIAGNOSIS — E78.2 MIXED HYPERLIPIDEMIA: ICD-10-CM

## 2023-12-18 DIAGNOSIS — R73.03 PREDIABETES: ICD-10-CM

## 2023-12-18 DIAGNOSIS — E55.9 VITAMIN D DEFICIENCY: ICD-10-CM

## 2023-12-18 PROCEDURE — 99214 OFFICE O/P EST MOD 30 MIN: CPT | Performed by: FAMILY MEDICINE

## 2023-12-18 PROCEDURE — G0442 ANNUAL ALCOHOL SCREEN 15 MIN: HCPCS | Performed by: FAMILY MEDICINE

## 2023-12-18 PROCEDURE — 3079F DIAST BP 80-89 MM HG: CPT | Performed by: FAMILY MEDICINE

## 2023-12-18 PROCEDURE — 1036F TOBACCO NON-USER: CPT | Performed by: FAMILY MEDICINE

## 2023-12-18 PROCEDURE — 1160F RVW MEDS BY RX/DR IN RCRD: CPT | Performed by: FAMILY MEDICINE

## 2023-12-18 PROCEDURE — 3075F SYST BP GE 130 - 139MM HG: CPT | Performed by: FAMILY MEDICINE

## 2023-12-18 PROCEDURE — 1159F MED LIST DOCD IN RCRD: CPT | Performed by: FAMILY MEDICINE

## 2023-12-18 PROCEDURE — 1125F AMNT PAIN NOTED PAIN PRSNT: CPT | Performed by: FAMILY MEDICINE

## 2023-12-18 RX ORDER — LOVASTATIN 20 MG/1
20 TABLET ORAL
Qty: 90 TABLET | Refills: 3 | Status: SHIPPED | OUTPATIENT
Start: 2023-12-18 | End: 2024-12-17

## 2023-12-18 ASSESSMENT — LIFESTYLE VARIABLES
HOW MANY STANDARD DRINKS CONTAINING ALCOHOL DO YOU HAVE ON A TYPICAL DAY: PATIENT DOES NOT DRINK
AUDIT-C TOTAL SCORE: 0
HOW OFTEN DO YOU HAVE A DRINK CONTAINING ALCOHOL: NEVER
SKIP TO QUESTIONS 9-10: 1
HOW OFTEN DO YOU HAVE SIX OR MORE DRINKS ON ONE OCCASION: NEVER
AUDIT-C TOTAL SCORE: 0
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
## 2024-01-24 ENCOUNTER — TELEPHONE (OUTPATIENT)
Dept: PRIMARY CARE | Facility: CLINIC | Age: 69
End: 2024-01-24
Payer: MEDICARE

## 2024-01-24 NOTE — TELEPHONE ENCOUNTER
Chelsea [phone] [facility] they did a test for Periferal artery disease and right side is 0.62 and left 0.87. Pt indicated no distress and you will get a computer note in 4-6 weeks.

## 2024-01-30 ENCOUNTER — PATIENT OUTREACH (OUTPATIENT)
Dept: PRIMARY CARE | Facility: CLINIC | Age: 69
End: 2024-01-30
Payer: MEDICARE

## 2024-01-30 DIAGNOSIS — K35.211 ACUTE APPENDICITIS WITH PERFORATION, GENERALIZED PERITONITIS, AND ABSCESS, WITHOUT GANGRENE: ICD-10-CM

## 2024-01-30 NOTE — PROGRESS NOTES
Outreach made to the patient for 90 day follow up post hospital discharge. Patient feels their condition has improved and has no further needs from TCM. Patient states she is finally feeling like herself after developing Covid a few weeks ago. Patients breathing is baseline. Patient in good spirits stating she will be going on vacation in 6 weeks. Patient denied any further questions or concerns at this time. Patient has met target of no readmissions for 90 days and has graduated from TCM Program.

## 2024-02-01 ENCOUNTER — HOSPITAL ENCOUNTER (OUTPATIENT)
Dept: RADIOLOGY | Facility: HOSPITAL | Age: 69
Discharge: HOME | End: 2024-02-01
Payer: MEDICARE

## 2024-02-01 DIAGNOSIS — Z12.31 ENCOUNTER FOR SCREENING MAMMOGRAM FOR BREAST CANCER: ICD-10-CM

## 2024-02-01 DIAGNOSIS — Z78.0 MENOPAUSE: ICD-10-CM

## 2024-02-01 PROCEDURE — 77067 SCR MAMMO BI INCL CAD: CPT | Performed by: RADIOLOGY

## 2024-02-01 PROCEDURE — 77085 DXA BONE DENSITY AXL VRT FX: CPT

## 2024-02-01 PROCEDURE — 77085 DXA BONE DENSITY AXL VRT FX: CPT | Performed by: RADIOLOGY

## 2024-02-01 PROCEDURE — 77067 SCR MAMMO BI INCL CAD: CPT

## 2024-02-01 PROCEDURE — 77063 BREAST TOMOSYNTHESIS BI: CPT | Performed by: RADIOLOGY

## 2024-02-02 DIAGNOSIS — M85.852 OSTEOPENIA OF NECK OF LEFT FEMUR: Primary | ICD-10-CM

## 2024-02-02 RX ORDER — IBANDRONATE SODIUM 150 MG/1
150 TABLET, FILM COATED ORAL
Qty: 3 TABLET | Refills: 3 | Status: SHIPPED | OUTPATIENT
Start: 2024-02-02 | End: 2025-02-01

## 2024-05-27 DIAGNOSIS — I10 PRIMARY HYPERTENSION: ICD-10-CM

## 2024-05-28 RX ORDER — POTASSIUM CHLORIDE 750 MG/1
10 TABLET, EXTENDED RELEASE ORAL DAILY
Qty: 100 TABLET | Refills: 2 | Status: SHIPPED | OUTPATIENT
Start: 2024-05-28

## 2024-06-10 DIAGNOSIS — I10 PRIMARY HYPERTENSION: ICD-10-CM

## 2024-06-11 RX ORDER — ATENOLOL AND CHLORTHALIDONE TABLET 100; 25 MG/1; MG/1
1 TABLET ORAL DAILY
Qty: 100 TABLET | Refills: 2 | Status: SHIPPED | OUTPATIENT
Start: 2024-06-11

## 2024-06-19 ENCOUNTER — APPOINTMENT (OUTPATIENT)
Dept: PRIMARY CARE | Facility: CLINIC | Age: 69
End: 2024-06-19
Payer: MEDICARE

## 2024-06-19 VITALS
HEART RATE: 71 BPM | HEIGHT: 60 IN | BODY MASS INDEX: 28.07 KG/M2 | OXYGEN SATURATION: 99 % | WEIGHT: 143 LBS | SYSTOLIC BLOOD PRESSURE: 122 MMHG | DIASTOLIC BLOOD PRESSURE: 68 MMHG

## 2024-06-19 DIAGNOSIS — R68.89 ABNORMAL ANKLE BRACHIAL INDEX: ICD-10-CM

## 2024-06-19 DIAGNOSIS — Z00.00 ROUTINE GENERAL MEDICAL EXAMINATION AT HEALTH CARE FACILITY: Primary | ICD-10-CM

## 2024-06-19 DIAGNOSIS — H61.22 HEARING LOSS DUE TO CERUMEN IMPACTION, LEFT: ICD-10-CM

## 2024-06-19 DIAGNOSIS — R73.03 PREDIABETES: ICD-10-CM

## 2024-06-19 DIAGNOSIS — I10 PRIMARY HYPERTENSION: ICD-10-CM

## 2024-06-19 DIAGNOSIS — E21.0 HYPERPARATHYROIDISM, PRIMARY (MULTI): ICD-10-CM

## 2024-06-19 DIAGNOSIS — Z71.89 CARDIAC RISK COUNSELING: ICD-10-CM

## 2024-06-19 DIAGNOSIS — M85.852 OSTEOPENIA OF NECK OF LEFT FEMUR: ICD-10-CM

## 2024-06-19 DIAGNOSIS — I73.9 CLAUDICATION (CMS-HCC): ICD-10-CM

## 2024-06-19 DIAGNOSIS — E78.2 MIXED HYPERLIPIDEMIA: ICD-10-CM

## 2024-06-19 DIAGNOSIS — E55.9 VITAMIN D DEFICIENCY: ICD-10-CM

## 2024-06-19 LAB
ALBUMIN SERPL BCP-MCNC: 4.5 G/DL (ref 3.4–5)
ALP SERPL-CCNC: 72 U/L (ref 33–136)
ALT SERPL W P-5'-P-CCNC: 27 U/L (ref 7–45)
ANION GAP SERPL CALC-SCNC: 14 MMOL/L (ref 10–20)
AST SERPL W P-5'-P-CCNC: 21 U/L (ref 9–39)
BILIRUB SERPL-MCNC: 0.8 MG/DL (ref 0–1.2)
BUN SERPL-MCNC: 16 MG/DL (ref 6–23)
CALCIUM SERPL-MCNC: 10.1 MG/DL (ref 8.6–10.3)
CHLORIDE SERPL-SCNC: 102 MMOL/L (ref 98–107)
CHOLEST SERPL-MCNC: 208 MG/DL (ref 0–199)
CHOLESTEROL/HDL RATIO: 5.6
CO2 SERPL-SCNC: 29 MMOL/L (ref 21–32)
CREAT SERPL-MCNC: 0.6 MG/DL (ref 0.5–1.05)
EGFRCR SERPLBLD CKD-EPI 2021: >90 ML/MIN/1.73M*2
GLUCOSE SERPL-MCNC: 126 MG/DL (ref 74–99)
HDLC SERPL-MCNC: 36.9 MG/DL
LDLC SERPL CALC-MCNC: 109 MG/DL
NON HDL CHOLESTEROL: 171 MG/DL (ref 0–149)
POC APPEARANCE, URINE: CLEAR
POC BILIRUBIN, URINE: NEGATIVE
POC BLOOD, URINE: NEGATIVE
POC COLOR, URINE: YELLOW
POC GLUCOSE, URINE: NEGATIVE MG/DL
POC KETONES, URINE: NEGATIVE MG/DL
POC LEUKOCYTES, URINE: NEGATIVE
POC NITRITE,URINE: NEGATIVE
POC PH, URINE: 6 PH
POC PROTEIN, URINE: ABNORMAL MG/DL
POC SPECIFIC GRAVITY, URINE: >=1.03
POC UROBILINOGEN, URINE: 0.2 EU/DL
POTASSIUM SERPL-SCNC: 3.5 MMOL/L (ref 3.5–5.3)
PROT SERPL-MCNC: 6.5 G/DL (ref 6.4–8.2)
PTH-INTACT SERPL-MCNC: 91.5 PG/ML (ref 18.5–88)
SODIUM SERPL-SCNC: 141 MMOL/L (ref 136–145)
TRIGL SERPL-MCNC: 311 MG/DL (ref 0–149)
VLDL: 62 MG/DL (ref 0–40)

## 2024-06-19 PROCEDURE — 80061 LIPID PANEL: CPT

## 2024-06-19 PROCEDURE — 36415 COLL VENOUS BLD VENIPUNCTURE: CPT

## 2024-06-19 PROCEDURE — 80053 COMPREHEN METABOLIC PANEL: CPT

## 2024-06-19 PROCEDURE — 1170F FXNL STATUS ASSESSED: CPT | Performed by: FAMILY MEDICINE

## 2024-06-19 PROCEDURE — 1158F ADVNC CARE PLAN TLK DOCD: CPT | Performed by: FAMILY MEDICINE

## 2024-06-19 PROCEDURE — 3074F SYST BP LT 130 MM HG: CPT | Performed by: FAMILY MEDICINE

## 2024-06-19 PROCEDURE — 81003 URINALYSIS AUTO W/O SCOPE: CPT | Performed by: FAMILY MEDICINE

## 2024-06-19 PROCEDURE — 82306 VITAMIN D 25 HYDROXY: CPT

## 2024-06-19 PROCEDURE — G0446 INTENS BEHAVE THER CARDIO DX: HCPCS | Performed by: FAMILY MEDICINE

## 2024-06-19 PROCEDURE — 1036F TOBACCO NON-USER: CPT | Performed by: FAMILY MEDICINE

## 2024-06-19 PROCEDURE — 99397 PER PM REEVAL EST PAT 65+ YR: CPT | Performed by: FAMILY MEDICINE

## 2024-06-19 PROCEDURE — 69210 REMOVE IMPACTED EAR WAX UNI: CPT | Performed by: FAMILY MEDICINE

## 2024-06-19 PROCEDURE — 83970 ASSAY OF PARATHORMONE: CPT

## 2024-06-19 PROCEDURE — 1123F ACP DISCUSS/DSCN MKR DOCD: CPT | Performed by: FAMILY MEDICINE

## 2024-06-19 PROCEDURE — 99214 OFFICE O/P EST MOD 30 MIN: CPT | Performed by: FAMILY MEDICINE

## 2024-06-19 PROCEDURE — 3078F DIAST BP <80 MM HG: CPT | Performed by: FAMILY MEDICINE

## 2024-06-19 PROCEDURE — 1159F MED LIST DOCD IN RCRD: CPT | Performed by: FAMILY MEDICINE

## 2024-06-19 PROCEDURE — G0439 PPPS, SUBSEQ VISIT: HCPCS | Performed by: FAMILY MEDICINE

## 2024-06-19 PROCEDURE — 1160F RVW MEDS BY RX/DR IN RCRD: CPT | Performed by: FAMILY MEDICINE

## 2024-06-19 RX ORDER — IBANDRONATE SODIUM 150 MG/1
150 TABLET, FILM COATED ORAL
Qty: 3 TABLET | Refills: 3 | Status: SHIPPED | OUTPATIENT
Start: 2024-06-19 | End: 2025-06-19

## 2024-06-19 RX ORDER — AMOXICILLIN 500 MG/1
500 CAPSULE ORAL
COMMUNITY
Start: 2024-06-15

## 2024-06-19 RX ORDER — ATENOLOL AND CHLORTHALIDONE TABLET 100; 25 MG/1; MG/1
1 TABLET ORAL DAILY
Qty: 100 TABLET | Refills: 2 | Status: SHIPPED | OUTPATIENT
Start: 2024-06-19

## 2024-06-19 RX ORDER — LOVASTATIN 20 MG/1
20 TABLET ORAL
Qty: 90 TABLET | Refills: 3 | Status: SHIPPED | OUTPATIENT
Start: 2024-06-19 | End: 2025-06-19

## 2024-06-19 RX ORDER — POTASSIUM CHLORIDE 750 MG/1
10 TABLET, FILM COATED, EXTENDED RELEASE ORAL DAILY
Qty: 100 TABLET | Refills: 2 | Status: SHIPPED | OUTPATIENT
Start: 2024-06-19

## 2024-06-19 ASSESSMENT — LIFESTYLE VARIABLES
HOW MANY STANDARD DRINKS CONTAINING ALCOHOL DO YOU HAVE ON A TYPICAL DAY: PATIENT DOES NOT DRINK
SKIP TO QUESTIONS 9-10: 1
AUDIT-C TOTAL SCORE: 0
HOW OFTEN DO YOU HAVE SIX OR MORE DRINKS ON ONE OCCASION: NEVER
AUDIT-C TOTAL SCORE: 0
HOW OFTEN DO YOU HAVE A DRINK CONTAINING ALCOHOL: NEVER

## 2024-06-19 ASSESSMENT — PATIENT HEALTH QUESTIONNAIRE - PHQ9
2. FEELING DOWN, DEPRESSED OR HOPELESS: NOT AT ALL
SUM OF ALL RESPONSES TO PHQ9 QUESTIONS 1 & 2: 0
1. LITTLE INTEREST OR PLEASURE IN DOING THINGS: NOT AT ALL
SUM OF ALL RESPONSES TO PHQ9 QUESTIONS 1 AND 2: 0
1. LITTLE INTEREST OR PLEASURE IN DOING THINGS: NOT AT ALL
2. FEELING DOWN, DEPRESSED OR HOPELESS: NOT AT ALL

## 2024-06-19 ASSESSMENT — ENCOUNTER SYMPTOMS
OCCASIONAL FEELINGS OF UNSTEADINESS: 0
LOSS OF SENSATION IN FEET: 0
DIARRHEA: 0
FATIGUE: 0
DEPRESSION: 0
TREMORS: 0
POLYPHAGIA: 0
ADENOPATHY: 0
SORE THROAT: 0
COUGH: 0
CONSTIPATION: 0
TROUBLE SWALLOWING: 0
BLOOD IN STOOL: 0
FREQUENCY: 0
COLOR CHANGE: 0
DYSPHORIC MOOD: 0
FEVER: 0
EYE REDNESS: 0
WEAKNESS: 0
HEADACHES: 0
VOMITING: 0
DYSURIA: 0
HEMATURIA: 0
BRUISES/BLEEDS EASILY: 0
CHEST TIGHTNESS: 0
WHEEZING: 0
PALPITATIONS: 0
ARTHRALGIAS: 1
EYE PAIN: 0
SHORTNESS OF BREATH: 0
NUMBNESS: 1
NAUSEA: 0
DIZZINESS: 0
BACK PAIN: 0
NERVOUS/ANXIOUS: 0
CHILLS: 0
ABDOMINAL PAIN: 0
POLYDIPSIA: 0

## 2024-06-19 ASSESSMENT — ACTIVITIES OF DAILY LIVING (ADL)
MANAGING_FINANCES: INDEPENDENT
DOING_HOUSEWORK: INDEPENDENT
BATHING: INDEPENDENT
TAKING_MEDICATION: INDEPENDENT
GROCERY_SHOPPING: INDEPENDENT
DRESSING: INDEPENDENT

## 2024-06-19 NOTE — PROGRESS NOTES
Subjective   Reason for Visit: Mari Deluca is an 68 y.o. female here for a Medicare Wellness visit.     Past Medical, Surgical, and Family History reviewed and updated in chart.    Reviewed all medications by prescribing practitioner or clinical pharmacist (such as prescriptions, OTCs, herbal therapies and supplements) and documented in the medical record.    HPI  No SE to meds   Denies fevers, chills, HA. CP, SOB, palpitations, weakness, dizziness, HA, vision changes   B12 helping with tingling in her hands and fingers at night      Appetite good  Sleeping is good   Energy level good   Balanced diet     Right leg has a numbness- no pain with exertion     Ophtho- 12/23  Dentist-seeing in July  Colonoscopy- 9/23  UA/Micro- 6/23  Mammo- 2/24  DEXA- 2/24  PAP- N/A  Lung CT- N/A  Coronary Calcium CT Score-  EKG-  Pneumovax- 12/19  Prevnar- 10/18  Flu-11/22  Shingrix-  Td-  Hep C-  Advance Directives- has them  MDD screen- 6/19/24  ETOH screen- 6/19/24  CV risk- 6/19/24  AWV- 6/19/24    Patient Care Team:  Anders Pappas DO as PCP - General (Family Medicine)  Anders Pappas DO as PCP - [employer] PCP  Franko Saravia DO as Consulting Physician (Orthopaedic Surgery)  Ashu Jason DPM as Surgeon (Podiatry)  Malcolm Simpson DO as Consulting Physician (Family Medicine)     Review of Systems   Constitutional:  Negative for chills, fatigue and fever.   HENT:  Negative for congestion, ear discharge, ear pain, hearing loss, nosebleeds, sore throat, tinnitus and trouble swallowing.    Eyes:  Negative for pain, redness and visual disturbance.   Respiratory:  Negative for cough, chest tightness, shortness of breath and wheezing.    Cardiovascular:  Negative for chest pain, palpitations and leg swelling.   Gastrointestinal:  Negative for abdominal pain, blood in stool, constipation, diarrhea, nausea and vomiting.   Endocrine: Negative for cold intolerance, heat intolerance, polydipsia,  polyphagia and polyuria.   Genitourinary:  Negative for dysuria, frequency, hematuria and urgency.   Musculoskeletal:  Positive for arthralgias. Negative for back pain and gait problem.   Skin:  Negative for color change and rash.   Neurological:  Positive for numbness. Negative for dizziness, tremors, syncope, weakness and headaches.   Hematological:  Negative for adenopathy. Does not bruise/bleed easily.   Psychiatric/Behavioral:  Negative for dysphoric mood. The patient is not nervous/anxious.    All other systems reviewed and are negative.      Objective   Vitals:  /68   Pulse 71   Ht 1.524 m (5')   Wt 64.9 kg (143 lb)   SpO2 99%   BMI 27.93 kg/m²       Physical Exam  Constitutional:       Appearance: Normal appearance.   HENT:      Head: Normocephalic and atraumatic.      Right Ear: Tympanic membrane, ear canal and external ear normal.      Left Ear: Tympanic membrane, ear canal and external ear normal. There is impacted cerumen.      Nose: Nose normal.      Mouth/Throat:      Mouth: Mucous membranes are moist.      Pharynx: Oropharynx is clear.   Eyes:      Extraocular Movements: Extraocular movements intact.      Conjunctiva/sclera: Conjunctivae normal.      Pupils: Pupils are equal, round, and reactive to light.   Cardiovascular:      Rate and Rhythm: Normal rate and regular rhythm.      Pulses: Normal pulses.      Heart sounds: Normal heart sounds.   Pulmonary:      Effort: Pulmonary effort is normal.      Breath sounds: Normal breath sounds.   Abdominal:      General: Abdomen is flat. Bowel sounds are normal.      Palpations: Abdomen is soft.   Musculoskeletal:      Cervical back: Normal range of motion and neck supple.   Skin:     General: Skin is warm and dry.      Capillary Refill: Capillary refill takes less than 2 seconds.   Neurological:      General: No focal deficit present.      Mental Status: She is alert and oriented to person, place, and time.      Cranial Nerves: No cranial nerve  deficit.      Sensory: No sensory deficit.      Motor: No weakness.      Deep Tendon Reflexes: Reflexes normal.   Psychiatric:         Mood and Affect: Mood normal.         Behavior: Behavior normal.     Ear Cerumen Removal    Date/Time: 6/19/2024 9:24 PM    Performed by: Anders Pappas DO  Authorized by: Anders Pappas DO    Consent:     Consent obtained:  Verbal    Consent given by:  Patient    Risks, benefits, and alternatives were discussed: yes      Risks discussed:  Bleeding, incomplete removal, infection, dizziness, TM perforation and pain    Alternatives discussed:  Delayed treatment, no treatment, alternative treatment, observation and referral  Universal protocol:     Procedure explained and questions answered to patient or proxy's satisfaction: yes      Patient identity confirmed:  Verbally with patient  Procedure details:     Location:  L ear    Procedure type: curette      Procedure type comment:  Irrigation also    Procedure outcomes: cerumen removed    Post-procedure details:     Inspection:  Some cerumen remaining, no bleeding and TM intact    Hearing quality:  Improved    Procedure completion:  Tolerated well, no immediate complications      Assessment/Plan   Problem List Items Addressed This Visit       Vitamin D deficiency    Relevant Orders    Vitamin D 25-Hydroxy,Total (for eval of Vitamin D levels)    Primary hypertension    Relevant Medications    atenoloL-chlorthalidone (Tenoretic) 100-25 mg tablet    potassium chloride CR 10 mEq ER tablet    Other Relevant Orders    Comprehensive Metabolic Panel (Completed)    POCT UA Automated manually resulted (Completed)    Prediabetes    Hyperparathyroidism, primary (Multi)    Relevant Orders    Comprehensive Metabolic Panel (Completed)    PTH, intact    Hyperlipidemia    Relevant Medications    lovastatin (Mevacor) 20 mg tablet    Other Relevant Orders    Lipid Panel (Completed)    Comprehensive Metabolic Panel (Completed)     Other Visit  Diagnoses       Routine general medical examination at health care facility    -  Primary    Cardiac risk counseling        Claudication (CMS-HCC)        Relevant Orders    Vascular US PVR With Exercise    Hearing loss due to cerumen impaction, left        Abnormal ankle brachial index        Relevant Orders    Vascular US PVR With Exercise    Osteopenia of neck of left femur        Relevant Medications    ibandronate (Boniva) 150 mg tablet        Renewed/continued rest of medications  Checked labs  Updated Health Maintenance in HPI section  HTN, controlled- continue meds, low sodium diet     Overweight- caloric restriction, increase CV exercise     Hyperlipidemia- continue meds, low fat/cholesterol diet     Hyperparathyroidism- follow PTH, vitamin D     Vitamin D- supplement, follow level     Neuropathy- check B12 level, continue supplement    ? Claudication, low VIPUL- get PVR, CV exercise    Prediabetes- limit sugary foods/drinks, check labs    Cerumen impaction- clean out, peroxide to get the rest out     F/U 6 months       Cardiovascular risk discussed and, if needed, lifestyle modifications recommended, including nutritional choices, exercise, and elimination of habits contributing to risk. We agreed on a plan to reduce the current cardiovascular risk. See the ASCVD Risk  Plus (11.9%) for data discussed regarding risk and risk reduction opportunities. Aspirin use/disuse was discussed after reviewing updated guidelines

## 2024-06-20 DIAGNOSIS — R80.0 ISOLATED PROTEINURIA WITHOUT SPECIFIC MORPHOLOGIC LESION: Primary | ICD-10-CM

## 2024-06-20 LAB — 25(OH)D3 SERPL-MCNC: 36 NG/ML (ref 30–100)

## 2024-06-20 NOTE — PROGRESS NOTES
Subjective   Patient ID: Mari Deluca is a 68 y.o. female who presents for No chief complaint on file..  HPI    Review of Systems    Objective   Physical Exam    Assessment/Plan            Anders Pappas DO 06/20/24 7:23 AM

## 2024-07-22 ENCOUNTER — HOSPITAL ENCOUNTER (OUTPATIENT)
Dept: VASCULAR MEDICINE | Facility: HOSPITAL | Age: 69
Discharge: HOME | End: 2024-07-22
Payer: MEDICARE

## 2024-07-22 DIAGNOSIS — I73.9 CLAUDICATION (CMS-HCC): ICD-10-CM

## 2024-07-22 DIAGNOSIS — R68.89 ABNORMAL ANKLE BRACHIAL INDEX: ICD-10-CM

## 2024-07-22 PROCEDURE — 93924 LWR XTR VASC STDY BILAT: CPT | Performed by: INTERNAL MEDICINE

## 2024-07-22 PROCEDURE — 93924 LWR XTR VASC STDY BILAT: CPT

## 2024-09-27 ASSESSMENT — DERMATOLOGY QUALITY OF LIFE (QOL) ASSESSMENT
WHAT SINGLE SKIN CONDITION LISTED BELOW IS THE PATIENT ANSWERING THE QUALITY-OF-LIFE ASSESSMENT QUESTIONS ABOUT: NONE OF THE ABOVE
RATE HOW BOTHERED YOU ARE BY SYMPTOMS OF YOUR SKIN PROBLEM (EG, ITCHING, STINGING BURNING, HURTING OR SKIN IRRITATION): 1
RATE HOW BOTHERED YOU ARE BY SYMPTOMS OF YOUR SKIN PROBLEM (EG, ITCHING, STINGING BURNING, HURTING OR SKIN IRRITATION): 1
WHAT SINGLE SKIN CONDITION LISTED BELOW IS THE PATIENT ANSWERING THE QUALITY-OF-LIFE ASSESSMENT QUESTIONS ABOUT: NONE OF THE ABOVE
RATE HOW BOTHERED YOU ARE BY EFFECTS OF YOUR SKIN PROBLEMS ON YOUR ACTIVITIES (EG, GOING OUT, ACCOMPLISHING WHAT YOU WANT, WORK ACTIVITIES OR YOUR RELATIONSHIPS WITH OTHERS): 0 - NEVER BOTHERED
RATE HOW EMOTIONALLY BOTHERED YOU ARE BY YOUR SKIN PROBLEM (FOR EXAMPLE, WORRY, EMBARRASSMENT, FRUSTRATION): 0 - NEVER BOTHERED
RATE HOW EMOTIONALLY BOTHERED YOU ARE BY YOUR SKIN PROBLEM (FOR EXAMPLE, WORRY, EMBARRASSMENT, FRUSTRATION): 0 - NEVER BOTHERED
RATE HOW BOTHERED YOU ARE BY EFFECTS OF YOUR SKIN PROBLEMS ON YOUR ACTIVITIES (EG, GOING OUT, ACCOMPLISHING WHAT YOU WANT, WORK ACTIVITIES OR YOUR RELATIONSHIPS WITH OTHERS): 0 - NEVER BOTHERED

## 2024-09-28 ENCOUNTER — APPOINTMENT (OUTPATIENT)
Dept: DERMATOLOGY | Facility: CLINIC | Age: 69
End: 2024-09-28
Payer: MEDICARE

## 2024-09-28 DIAGNOSIS — L72.0 EPIDERMAL CYST: Primary | ICD-10-CM

## 2024-09-28 PROCEDURE — 1036F TOBACCO NON-USER: CPT | Performed by: NURSE PRACTITIONER

## 2024-09-28 PROCEDURE — 99212 OFFICE O/P EST SF 10 MIN: CPT | Performed by: NURSE PRACTITIONER

## 2024-09-28 PROCEDURE — 1159F MED LIST DOCD IN RCRD: CPT | Performed by: NURSE PRACTITIONER

## 2024-09-28 NOTE — PROGRESS NOTES
Subjective   Mari Deluca is a 69 y.o. female who presents for the following: Suspicious Skin Lesion. Lesion on her chest that first appeared in June. Has gotten larger, inflamed and painful in the past. She reports the lesion has decreased in size over the past two weeks.     Objective   Well appearing patient in no apparent distress; mood and affect are within normal limits.    A focused examination was performed including chest. All findings within normal limits unless otherwise noted below.    Chest - Medial (Center)  2 x1 cm subcutaneous nodule with normal-appearing overlying skin.      Assessment/Plan   Epidermal cyst  Chest - [facility] (Center)    -Discussed nature of the condition  -Reassurance provided. Patient would like to proceed with excision. Will submit referral to derm surgery for prior authorization and scheduling.  -Patient will continue to follow up for her regular scheduled skin exams.    Referral to Dermatology - Mohs Surgery - Chest [facility] (Center)    I was present during all key portions of visit including history, exam, discussion/plan and/or procedures and directly supervised our resident during all portions of the visit, follow up care, medications and more.

## 2024-11-07 ENCOUNTER — APPOINTMENT (OUTPATIENT)
Dept: DERMATOLOGY | Facility: CLINIC | Age: 69
End: 2024-11-07
Payer: MEDICARE

## 2024-11-07 DIAGNOSIS — L81.4 LENTIGO: ICD-10-CM

## 2024-11-07 DIAGNOSIS — Z12.83 SCREENING EXAM FOR SKIN CANCER: ICD-10-CM

## 2024-11-07 DIAGNOSIS — L82.1 SEBORRHEIC KERATOSIS: ICD-10-CM

## 2024-11-07 DIAGNOSIS — D23.9 DERMATOFIBROMA: ICD-10-CM

## 2024-11-07 DIAGNOSIS — L72.0 EPIDERMAL CYST: ICD-10-CM

## 2024-11-07 DIAGNOSIS — L90.5 SCAR CONDITIONS AND FIBROSIS OF SKIN: ICD-10-CM

## 2024-11-07 DIAGNOSIS — D22.9 MULTIPLE BENIGN NEVI: Primary | ICD-10-CM

## 2024-11-07 PROCEDURE — 99213 OFFICE O/P EST LOW 20 MIN: CPT | Performed by: DERMATOLOGY

## 2024-11-07 ASSESSMENT — DERMATOLOGY PATIENT ASSESSMENT
DO YOU USE SUNSCREEN: OCCASIONALLY
HAVE YOU HAD OR DO YOU HAVE A STAPH INFECTION: NO
HAVE YOU HAD OR DO YOU HAVE VASCULAR DISEASE: NO
ARE YOU TRYING TO GET PREGNANT: NO
DO YOU HAVE IRREGULAR MENSTRUAL CYCLES: NO
DO YOU HAVE ANY NEW OR CHANGING LESIONS: NO
ARE YOU AN ORGAN TRANSPLANT RECIPIENT: NO
DO YOU USE A TANNING BED: NO
ARE YOU ON BIRTH CONTROL: NO

## 2024-11-07 ASSESSMENT — DERMATOLOGY QUALITY OF LIFE (QOL) ASSESSMENT
ARE THERE EXCLUSIONS OR EXCEPTIONS FOR THE QUALITY OF LIFE ASSESSMENT: NO
RATE HOW BOTHERED YOU ARE BY EFFECTS OF YOUR SKIN PROBLEMS ON YOUR ACTIVITIES (EG, GOING OUT, ACCOMPLISHING WHAT YOU WANT, WORK ACTIVITIES OR YOUR RELATIONSHIPS WITH OTHERS): 0 - NEVER BOTHERED
DATE THE QUALITY-OF-LIFE ASSESSMENT WAS COMPLETED: 67151
RATE HOW EMOTIONALLY BOTHERED YOU ARE BY YOUR SKIN PROBLEM (FOR EXAMPLE, WORRY, EMBARRASSMENT, FRUSTRATION): 0 - NEVER BOTHERED
RATE HOW BOTHERED YOU ARE BY SYMPTOMS OF YOUR SKIN PROBLEM (EG, ITCHING, STINGING BURNING, HURTING OR SKIN IRRITATION): 0 - NEVER BOTHERED

## 2024-11-07 ASSESSMENT — PATIENT GLOBAL ASSESSMENT (PGA): PATIENT GLOBAL ASSESSMENT: PATIENT GLOBAL ASSESSMENT:  1 - CLEAR

## 2024-11-07 ASSESSMENT — ITCH NUMERIC RATING SCALE: HOW SEVERE IS YOUR ITCHING?: 0
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
## 2024-12-19 ENCOUNTER — APPOINTMENT (OUTPATIENT)
Dept: PRIMARY CARE | Facility: CLINIC | Age: 69
End: 2024-12-19
Payer: MEDICARE

## 2024-12-19 VITALS
HEART RATE: 46 BPM | DIASTOLIC BLOOD PRESSURE: 70 MMHG | WEIGHT: 146.4 LBS | BODY MASS INDEX: 28.59 KG/M2 | SYSTOLIC BLOOD PRESSURE: 126 MMHG | OXYGEN SATURATION: 97 %

## 2024-12-19 DIAGNOSIS — I10 PRIMARY HYPERTENSION: ICD-10-CM

## 2024-12-19 DIAGNOSIS — E78.2 MIXED HYPERLIPIDEMIA: Primary | ICD-10-CM

## 2024-12-19 DIAGNOSIS — Z12.31 ENCOUNTER FOR SCREENING MAMMOGRAM FOR BREAST CANCER: ICD-10-CM

## 2024-12-19 DIAGNOSIS — E21.0 HYPERPARATHYROIDISM, PRIMARY (MULTI): ICD-10-CM

## 2024-12-19 DIAGNOSIS — E55.9 VITAMIN D DEFICIENCY: ICD-10-CM

## 2024-12-19 LAB
25(OH)D3 SERPL-MCNC: 65 NG/ML (ref 30–100)
ALBUMIN SERPL BCP-MCNC: 4.4 G/DL (ref 3.4–5)
ALP SERPL-CCNC: 70 U/L (ref 33–136)
ALT SERPL W P-5'-P-CCNC: 26 U/L (ref 7–45)
ANION GAP SERPL CALC-SCNC: 12 MMOL/L (ref 10–20)
AST SERPL W P-5'-P-CCNC: 20 U/L (ref 9–39)
BILIRUB SERPL-MCNC: 0.9 MG/DL (ref 0–1.2)
BUN SERPL-MCNC: 20 MG/DL (ref 6–23)
CALCIUM SERPL-MCNC: 10.5 MG/DL (ref 8.6–10.3)
CHLORIDE SERPL-SCNC: 101 MMOL/L (ref 98–107)
CHOLEST SERPL-MCNC: 211 MG/DL (ref 0–199)
CHOLESTEROL/HDL RATIO: 6.3
CO2 SERPL-SCNC: 31 MMOL/L (ref 21–32)
CREAT SERPL-MCNC: 0.58 MG/DL (ref 0.5–1.05)
EGFRCR SERPLBLD CKD-EPI 2021: >90 ML/MIN/1.73M*2
GLUCOSE SERPL-MCNC: 114 MG/DL (ref 74–99)
HDLC SERPL-MCNC: 33.7 MG/DL
LDLC SERPL CALC-MCNC: 124 MG/DL
NON HDL CHOLESTEROL: 177 MG/DL (ref 0–149)
POTASSIUM SERPL-SCNC: 3.6 MMOL/L (ref 3.5–5.3)
PROT SERPL-MCNC: 6.5 G/DL (ref 6.4–8.2)
PTH-INTACT SERPL-MCNC: 43.4 PG/ML (ref 18.5–88)
SODIUM SERPL-SCNC: 140 MMOL/L (ref 136–145)
TRIGL SERPL-MCNC: 268 MG/DL (ref 0–149)
TSH SERPL-ACNC: 1.55 MIU/L (ref 0.44–3.98)
VLDL: 54 MG/DL (ref 0–40)

## 2024-12-19 PROCEDURE — 90662 IIV NO PRSV INCREASED AG IM: CPT | Performed by: FAMILY MEDICINE

## 2024-12-19 PROCEDURE — G0008 ADMIN INFLUENZA VIRUS VAC: HCPCS | Performed by: FAMILY MEDICINE

## 2024-12-19 PROCEDURE — 3078F DIAST BP <80 MM HG: CPT | Performed by: FAMILY MEDICINE

## 2024-12-19 PROCEDURE — 80053 COMPREHEN METABOLIC PANEL: CPT

## 2024-12-19 PROCEDURE — 83970 ASSAY OF PARATHORMONE: CPT

## 2024-12-19 PROCEDURE — 84443 ASSAY THYROID STIM HORMONE: CPT

## 2024-12-19 PROCEDURE — 1036F TOBACCO NON-USER: CPT | Performed by: FAMILY MEDICINE

## 2024-12-19 PROCEDURE — 1123F ACP DISCUSS/DSCN MKR DOCD: CPT | Performed by: FAMILY MEDICINE

## 2024-12-19 PROCEDURE — 1159F MED LIST DOCD IN RCRD: CPT | Performed by: FAMILY MEDICINE

## 2024-12-19 PROCEDURE — 80061 LIPID PANEL: CPT

## 2024-12-19 PROCEDURE — 3074F SYST BP LT 130 MM HG: CPT | Performed by: FAMILY MEDICINE

## 2024-12-19 PROCEDURE — 99214 OFFICE O/P EST MOD 30 MIN: CPT | Performed by: FAMILY MEDICINE

## 2024-12-19 PROCEDURE — 1160F RVW MEDS BY RX/DR IN RCRD: CPT | Performed by: FAMILY MEDICINE

## 2024-12-19 PROCEDURE — G2211 COMPLEX E/M VISIT ADD ON: HCPCS | Performed by: FAMILY MEDICINE

## 2024-12-19 PROCEDURE — 82306 VITAMIN D 25 HYDROXY: CPT
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
## 2025-02-03 ENCOUNTER — HOSPITAL ENCOUNTER (OUTPATIENT)
Dept: RADIOLOGY | Facility: HOSPITAL | Age: 70
Discharge: HOME | End: 2025-02-03
Payer: MEDICARE

## 2025-02-03 VITALS — HEIGHT: 60 IN | WEIGHT: 146 LBS | BODY MASS INDEX: 28.66 KG/M2

## 2025-02-03 DIAGNOSIS — Z12.31 ENCOUNTER FOR SCREENING MAMMOGRAM FOR BREAST CANCER: ICD-10-CM

## 2025-02-03 PROCEDURE — 77063 BREAST TOMOSYNTHESIS BI: CPT

## 2025-02-03 PROCEDURE — 77067 SCR MAMMO BI INCL CAD: CPT | Performed by: RADIOLOGY

## 2025-02-03 PROCEDURE — 77063 BREAST TOMOSYNTHESIS BI: CPT | Performed by: RADIOLOGY

## 2025-02-28 DIAGNOSIS — E78.2 MIXED HYPERLIPIDEMIA: ICD-10-CM

## 2025-02-28 DIAGNOSIS — I10 PRIMARY HYPERTENSION: ICD-10-CM

## 2025-02-28 RX ORDER — ATENOLOL AND CHLORTHALIDONE TABLET 100; 25 MG/1; MG/1
1 TABLET ORAL DAILY
Qty: 100 TABLET | Refills: 2 | Status: SHIPPED | OUTPATIENT
Start: 2025-02-28

## 2025-02-28 RX ORDER — LOVASTATIN 20 MG/1
TABLET ORAL
Qty: 100 TABLET | Refills: 2 | Status: SHIPPED | OUTPATIENT
Start: 2025-02-28

## 2025-04-11 DIAGNOSIS — I10 PRIMARY HYPERTENSION: ICD-10-CM

## 2025-04-11 RX ORDER — POTASSIUM CHLORIDE 750 MG/1
10 TABLET, EXTENDED RELEASE ORAL DAILY
Qty: 100 TABLET | Refills: 2 | Status: SHIPPED | OUTPATIENT
Start: 2025-04-11

## 2025-04-14 ENCOUNTER — APPOINTMENT (OUTPATIENT)
Dept: DERMATOLOGY | Facility: CLINIC | Age: 70
End: 2025-04-14
Payer: MEDICARE

## 2025-05-30 DIAGNOSIS — M85.852 OSTEOPENIA OF NECK OF LEFT FEMUR: ICD-10-CM

## 2025-06-02 RX ORDER — IBANDRONATE SODIUM 150 MG/1
TABLET, FILM COATED ORAL
Qty: 3 TABLET | Refills: 3 | Status: SHIPPED | OUTPATIENT
Start: 2025-06-02

## 2025-06-23 ENCOUNTER — APPOINTMENT (OUTPATIENT)
Dept: PRIMARY CARE | Facility: CLINIC | Age: 70
End: 2025-06-23
Payer: MEDICARE

## 2025-06-23 VITALS
OXYGEN SATURATION: 98 % | WEIGHT: 142 LBS | DIASTOLIC BLOOD PRESSURE: 62 MMHG | HEIGHT: 60 IN | BODY MASS INDEX: 27.88 KG/M2 | HEART RATE: 68 BPM | SYSTOLIC BLOOD PRESSURE: 128 MMHG

## 2025-06-23 DIAGNOSIS — E55.9 VITAMIN D DEFICIENCY: ICD-10-CM

## 2025-06-23 DIAGNOSIS — Z71.89 CARDIAC RISK COUNSELING: ICD-10-CM

## 2025-06-23 DIAGNOSIS — R73.03 PREDIABETES: ICD-10-CM

## 2025-06-23 DIAGNOSIS — M85.852 OSTEOPENIA OF LEFT HIP: ICD-10-CM

## 2025-06-23 DIAGNOSIS — E78.2 MIXED HYPERLIPIDEMIA: ICD-10-CM

## 2025-06-23 DIAGNOSIS — E21.0 HYPERPARATHYROIDISM, PRIMARY (MULTI): ICD-10-CM

## 2025-06-23 DIAGNOSIS — Z00.00 ROUTINE GENERAL MEDICAL EXAMINATION AT HEALTH CARE FACILITY: Primary | ICD-10-CM

## 2025-06-23 PROCEDURE — 3078F DIAST BP <80 MM HG: CPT | Performed by: FAMILY MEDICINE

## 2025-06-23 PROCEDURE — G0446 INTENS BEHAVE THER CARDIO DX: HCPCS | Performed by: FAMILY MEDICINE

## 2025-06-23 PROCEDURE — 99397 PER PM REEVAL EST PAT 65+ YR: CPT | Performed by: FAMILY MEDICINE

## 2025-06-23 PROCEDURE — 1123F ACP DISCUSS/DSCN MKR DOCD: CPT | Performed by: FAMILY MEDICINE

## 2025-06-23 PROCEDURE — 3074F SYST BP LT 130 MM HG: CPT | Performed by: FAMILY MEDICINE

## 2025-06-23 PROCEDURE — 99214 OFFICE O/P EST MOD 30 MIN: CPT | Performed by: FAMILY MEDICINE

## 2025-06-23 PROCEDURE — 1170F FXNL STATUS ASSESSED: CPT | Performed by: FAMILY MEDICINE

## 2025-06-23 PROCEDURE — 3008F BODY MASS INDEX DOCD: CPT | Performed by: FAMILY MEDICINE

## 2025-06-23 PROCEDURE — G2211 COMPLEX E/M VISIT ADD ON: HCPCS | Performed by: FAMILY MEDICINE

## 2025-06-23 PROCEDURE — 1160F RVW MEDS BY RX/DR IN RCRD: CPT | Performed by: FAMILY MEDICINE

## 2025-06-23 PROCEDURE — 1159F MED LIST DOCD IN RCRD: CPT | Performed by: FAMILY MEDICINE

## 2025-06-23 PROCEDURE — G0439 PPPS, SUBSEQ VISIT: HCPCS | Performed by: FAMILY MEDICINE

## 2025-06-23 PROCEDURE — 1036F TOBACCO NON-USER: CPT | Performed by: FAMILY MEDICINE

## 2025-06-23 ASSESSMENT — ENCOUNTER SYMPTOMS
POLYDIPSIA: 0
FEVER: 0
FREQUENCY: 0
COUGH: 0
NAUSEA: 0
FATIGUE: 0
PALPITATIONS: 0
ADENOPATHY: 0
EYE REDNESS: 0
HEMATURIA: 0
CHILLS: 0
POLYPHAGIA: 0
DYSPHORIC MOOD: 0
DYSURIA: 0
WEAKNESS: 0
BLOOD IN STOOL: 0
VOMITING: 0
DEPRESSION: 0
ABDOMINAL PAIN: 0
CHEST TIGHTNESS: 0
SORE THROAT: 0
ARTHRALGIAS: 1
EYE PAIN: 0
OCCASIONAL FEELINGS OF UNSTEADINESS: 0
COLOR CHANGE: 0
CONSTIPATION: 0
HEADACHES: 0
BACK PAIN: 0
NUMBNESS: 0
LOSS OF SENSATION IN FEET: 0
WHEEZING: 0
TROUBLE SWALLOWING: 0
TREMORS: 0
BRUISES/BLEEDS EASILY: 0
DIZZINESS: 0
SHORTNESS OF BREATH: 0
DIARRHEA: 0
NERVOUS/ANXIOUS: 0

## 2025-06-23 ASSESSMENT — ACTIVITIES OF DAILY LIVING (ADL)
DRESSING: INDEPENDENT
TAKING_MEDICATION: INDEPENDENT
BATHING: INDEPENDENT
MANAGING_FINANCES: INDEPENDENT
GROCERY_SHOPPING: INDEPENDENT
DOING_HOUSEWORK: INDEPENDENT

## 2025-06-23 ASSESSMENT — PATIENT HEALTH QUESTIONNAIRE - PHQ9
SUM OF ALL RESPONSES TO PHQ9 QUESTIONS 1 AND 2: 0
1. LITTLE INTEREST OR PLEASURE IN DOING THINGS: NOT AT ALL
2. FEELING DOWN, DEPRESSED OR HOPELESS: NOT AT ALL

## 2025-06-23 NOTE — PROGRESS NOTES
Subjective   Reason for Visit: Mari Deluca is an 69 y.o. female here for a Medicare Wellness visit.     Past Medical, Surgical, and Family History reviewed and updated in chart.    Reviewed all medications by prescribing practitioner or clinical pharmacist (such as prescriptions, OTCs, herbal therapies and supplements) and documented in the medical record.    HPI  History of Present Illness  The patient is a 69-year-old female presenting today for a wellness visit. She has an extensive past medical history, but her primary concern at present is the stress associated with being the main caregiver for her mother, who is on hospice care. She has recently managed to secure some respite time, which has been beneficial.    She reports no chest pain, shortness of breath, palpitations, numbness, weakness, dizziness, or headaches. She also reports no changes in her vision. Her last ophthalmology appointment was in 12/2024, and she has been maintaining regular dental check-ups. She underwent a mammogram in 02/2025. She does not require any medication refills at this time.    She has been experiencing an abnormal bite, but is currently under the care of a dentist and orthodontist for this issue.     Ophtho- 12/24  Dentist- 7/24  Colonoscopy- 9/23- repeat 5 years  UA/Micro- 6/23  Mammo- 2/25  DEXA- 2/24  PAP- N/A  Lung CT- N/A  Coronary Calcium CT Score-  EKG-  Pneumovax- 12/19  Prevnar- 10/18  Flu-12/24  RSV- 12/30/23  Shingrix- 12/30/23  Td-  Hep C-  Advance Directives- has them  CV risk- 6/23/25  AWV- 6/23/25      Patient Care Team:  Anders Pappas DO as PCP - General (Family Medicine)  Anders Pappas DO as PCP - [employer] PCP  Franko Saravia DO as Consulting Physician (Orthopaedic Surgery)  Ashu Jason DPM as Surgeon (Podiatry)  Malcolm Simpson DO as Consulting Physician (Family Medicine)     Review of Systems   Constitutional:  Negative for chills, fatigue and fever.   HENT:   Negative for congestion, ear discharge, ear pain, hearing loss, nosebleeds, sore throat, tinnitus and trouble swallowing.    Eyes:  Negative for pain, redness and visual disturbance.   Respiratory:  Negative for cough, chest tightness, shortness of breath and wheezing.    Cardiovascular:  Negative for chest pain, palpitations and leg swelling.   Gastrointestinal:  Negative for abdominal pain, blood in stool, constipation, diarrhea, nausea and vomiting.   Endocrine: Negative for cold intolerance, heat intolerance, polydipsia, polyphagia and polyuria.   Genitourinary:  Negative for dysuria, frequency, hematuria and urgency.   Musculoskeletal:  Positive for arthralgias. Negative for back pain and gait problem.   Skin:  Negative for color change and rash.   Neurological:  Negative for dizziness, tremors, syncope, weakness, numbness and headaches.   Hematological:  Negative for adenopathy. Does not bruise/bleed easily.   Psychiatric/Behavioral:  Negative for dysphoric mood. The patient is not nervous/anxious.    All other systems reviewed and are negative.      Objective   Vitals:  /62   Pulse 68   Ht 1.524 m (5')   Wt 64.4 kg (142 lb)   SpO2 98%   BMI 27.73 kg/m²       Physical Exam  Vitals and nursing note reviewed.   Constitutional:       General: She is not in acute distress.     Appearance: Normal appearance.   HENT:      Head: Normocephalic and atraumatic.      Right Ear: Tympanic membrane, ear canal and external ear normal.      Left Ear: Tympanic membrane, ear canal and external ear normal.      Nose: Nose normal.      Mouth/Throat:      Mouth: Mucous membranes are moist.      Pharynx: Oropharynx is clear.   Eyes:      Extraocular Movements: Extraocular movements intact.      Conjunctiva/sclera: Conjunctivae normal.      Pupils: Pupils are equal, round, and reactive to light.   Neck:      Vascular: No carotid bruit.   Cardiovascular:      Rate and Rhythm: Normal rate and regular rhythm.      Pulses:  Normal pulses.      Heart sounds: Normal heart sounds. No murmur heard.  Pulmonary:      Effort: Pulmonary effort is normal.      Breath sounds: Normal breath sounds. No wheezing, rhonchi or rales.   Abdominal:      General: Abdomen is flat. Bowel sounds are normal.      Palpations: Abdomen is soft. There is no mass.   Musculoskeletal:      Cervical back: Normal range of motion and neck supple.   Lymphadenopathy:      Cervical: No cervical adenopathy.   Skin:     Capillary Refill: Capillary refill takes less than 2 seconds.   Neurological:      General: No focal deficit present.      Mental Status: She is alert and oriented to person, place, and time.      Cranial Nerves: No cranial nerve deficit.      Motor: No weakness.      Deep Tendon Reflexes: Reflexes normal.   Psychiatric:         Mood and Affect: Mood normal.         Behavior: Behavior normal.         Assessment & Plan  Routine general medical examination at health care facility         Mixed hyperlipidemia    Orders:    Lipid Panel    Comprehensive Metabolic Panel    Osteopenia of left hip         Cardiac risk counseling         Hyperparathyroidism, primary (Multi)    Orders:    Comprehensive Metabolic Panel    PTH, intact    Vitamin D deficiency    Orders:    Vitamin D 25-Hydroxy,Total (for eval of Vitamin D levels)    Prediabetes            Renewed/continued rest of medications  Checked labs  Updated Health Maintenance in HPI section  HTN, controlled- continue meds, low sodium diet     Overweight- caloric restriction, increase CV exercise     Hyperlipidemia- continue meds, low fat/cholesterol diet     Hyperparathyroidism- follow PTH, vitamin D     Vitamin D- supplement, follow level     Neuropathy- follow B12 level, continue supplement     Prediabetes- limit sugary foods/drinks, check labs     Cerumen impaction- recommended debrox for small amount of cerumen in elft ear canal     F/U 6 months     Cardiac Risk Assessment  5 minutes were spent discussing  Cardiovascular risk (14.8%) and, if needed, lifestyle modifications were recommended, including nutritional choices, exercise, and elimination of habits contributing to risk.   Aspirin use/disuse was discussed following the guidelines below:  low dose ASA ( mg) should be considered:    If prior Heart Attack/Stroke/Peripheral vascular disease:  Generally recommend daily low dose aspirin unless extremely high bleeding risk (e.g., gastrointestinal).    If no prior Heart Attack/Stroke/Peripheral vascular disease:              Age over 70: Do not use Aspirin for prevention    Age less than 70 and 10-year cardiovascular disease risk is >20%: use low dose Aspirin for prevention.

## 2025-06-25 LAB
25(OH)D3+25(OH)D2 SERPL-MCNC: 41 NG/ML (ref 30–100)
ALBUMIN SERPL-MCNC: 4.4 G/DL (ref 3.6–5.1)
ALP SERPL-CCNC: 68 U/L (ref 37–153)
ALT SERPL-CCNC: 27 U/L (ref 6–29)
ANION GAP SERPL CALCULATED.4IONS-SCNC: 11 MMOL/L (CALC) (ref 7–17)
AST SERPL-CCNC: 23 U/L (ref 10–35)
BILIRUB SERPL-MCNC: 0.7 MG/DL (ref 0.2–1.2)
BUN SERPL-MCNC: 19 MG/DL (ref 7–25)
CALCIUM SERPL-MCNC: 10.3 MG/DL (ref 8.6–10.4)
CHLORIDE SERPL-SCNC: 102 MMOL/L (ref 98–110)
CHOLEST SERPL-MCNC: 213 MG/DL
CHOLEST/HDLC SERPL: 5.1 (CALC)
CO2 SERPL-SCNC: 28 MMOL/L (ref 20–32)
CREAT SERPL-MCNC: 0.63 MG/DL (ref 0.6–1)
EGFRCR SERPLBLD CKD-EPI 2021: 95 ML/MIN/1.73M2
GLUCOSE SERPL-MCNC: 121 MG/DL (ref 65–99)
HDLC SERPL-MCNC: 42 MG/DL
LDLC SERPL CALC-MCNC: 123 MG/DL (CALC)
NONHDLC SERPL-MCNC: 171 MG/DL (CALC)
POTASSIUM SERPL-SCNC: 3.7 MMOL/L (ref 3.5–5.3)
PROT SERPL-MCNC: 6.6 G/DL (ref 6.1–8.1)
PTH-INTACT SERPL-MCNC: 66 PG/ML (ref 16–77)
SODIUM SERPL-SCNC: 141 MMOL/L (ref 135–146)
TRIGL SERPL-MCNC: 334 MG/DL

## 2025-11-11 ENCOUNTER — APPOINTMENT (OUTPATIENT)
Dept: DERMATOLOGY | Facility: CLINIC | Age: 70
End: 2025-11-11
Payer: MEDICARE

## 2025-12-22 ENCOUNTER — APPOINTMENT (OUTPATIENT)
Dept: PRIMARY CARE | Facility: CLINIC | Age: 70
End: 2025-12-22
Payer: MEDICARE

## (undated) DEVICE — DRAPE PACK, LAPAROSCOPIC CHOLECYSTECTOMY, CUSTOM, GEAUGA

## (undated) DEVICE — RESERVOIR, DRAINAGE, WOUND, JACKSON-PRATT, 100 CC, SILICONE

## (undated) DEVICE — HOLSTER, JET SAFETY

## (undated) DEVICE — ADHESIVE, SKIN, LIQUIBAND EXCEED

## (undated) DEVICE — COVER HANDLE LIGHT, STERIS, BLUE, STERILE

## (undated) DEVICE — SLEEVE, KII, Z-THREAD, 5X100CM

## (undated) DEVICE — TRAY, FOLEY, ADVANCE, 16FR, SILICONE, W/STATLOCK

## (undated) DEVICE — DRAPE, INSTRUMENT, W/POUCH, STERI DRAPE, 9 5/8 X 18 LONG

## (undated) DEVICE — NEEDLE, INSUFFLATION, 13GAX120MM, DISP

## (undated) DEVICE — SOLUTION, IRRIGATION, SODIUM CHLORIDE 0.9%, 1000 ML, POUR BOTTLE

## (undated) DEVICE — NEEDLE, SAFETY, 21 G X 1.5 IN

## (undated) DEVICE — TROCAR, OPTICAL BLADELESS 5MM X 100 W/ADVANCED FIXATION

## (undated) DEVICE — STAPLER, EF POWERED PLUS, CUTTER 340MM, 45MM EFFECTOR, DISP

## (undated) DEVICE — SUTURE, VICRYL, 4-0, 18 IN, PS2, UNDYED

## (undated) DEVICE — ELECTRODE, ELECTROSURGICAL, BLADE, INSULATED, ENT/IMA, STERILE